# Patient Record
Sex: FEMALE | Race: WHITE | Employment: OTHER | URBAN - METROPOLITAN AREA
[De-identification: names, ages, dates, MRNs, and addresses within clinical notes are randomized per-mention and may not be internally consistent; named-entity substitution may affect disease eponyms.]

---

## 2017-01-03 PROBLEM — J40 BRONCHITIS: Status: ACTIVE | Noted: 2017-01-03

## 2017-01-11 PROBLEM — J44.9 COPD (CHRONIC OBSTRUCTIVE PULMONARY DISEASE) (HCC): Status: ACTIVE | Noted: 2017-01-11

## 2017-01-18 ENCOUNTER — HOSPITAL ENCOUNTER (OUTPATIENT)
Dept: PULMONOLOGY | Age: 82
Discharge: HOME | End: 2017-01-18
Attending: RADIOLOGY | Admitting: RADIOLOGY

## 2017-01-18 VITALS — RESPIRATION RATE: 17 BRPM | OXYGEN SATURATION: 89 %

## 2017-01-18 DIAGNOSIS — C34.91 MALIGNANT NEOPLASM OF UNSPECIFIED PART OF RIGHT BRONCHUS OR LUNG (HCC): ICD-10-CM

## 2017-01-18 RX ORDER — ALBUTEROL SULFATE 2.5 MG/3ML
2.5 SOLUTION RESPIRATORY (INHALATION) ONCE
Status: COMPLETED | OUTPATIENT
Start: 2017-01-18 | End: 2017-01-18

## 2017-01-18 RX ADMIN — ALBUTEROL SULFATE 2.5 MG: 2.5 SOLUTION RESPIRATORY (INHALATION) at 11:26

## 2017-01-25 PROBLEM — R06.02 SOB (SHORTNESS OF BREATH): Status: ACTIVE | Noted: 2017-01-25

## 2017-01-25 PROBLEM — J44.1 COPD EXACERBATION (HCC): Status: ACTIVE | Noted: 2017-01-25

## 2017-01-25 PROBLEM — Z72.0 TOBACCO ABUSE: Status: ACTIVE | Noted: 2017-01-25

## 2017-01-25 PROBLEM — J44.1 COPD EXACERBATION (HCC): Status: ACTIVE | Noted: 2017-01-11

## 2017-01-25 PROBLEM — J40 TRACHEOBRONCHITIS: Status: ACTIVE | Noted: 2017-01-25

## 2017-01-25 PROBLEM — R09.02 HYPOXIA: Status: ACTIVE | Noted: 2017-01-25

## 2019-01-01 ENCOUNTER — HOSPITAL ENCOUNTER (OUTPATIENT)
Age: 84
Setting detail: OBSERVATION
Discharge: HOME | End: 2020-01-01
Attending: EMERGENCY MEDICINE | Admitting: INTERNAL MEDICINE
Payer: MEDICARE

## 2019-01-01 ENCOUNTER — HOSPITAL ENCOUNTER (EMERGENCY)
Age: 84
Discharge: HOME | End: 2019-09-12
Payer: MEDICARE

## 2019-01-01 ENCOUNTER — APPOINTMENT (OUTPATIENT)
Dept: GENERAL RADIOLOGY | Age: 84
End: 2019-01-01
Payer: MEDICARE

## 2019-01-01 ENCOUNTER — HOSPITAL ENCOUNTER (EMERGENCY)
Age: 84
Discharge: HOME | End: 2019-05-08
Payer: MEDICARE

## 2019-01-01 VITALS
HEIGHT: 64 IN | HEART RATE: 78 BPM | DIASTOLIC BLOOD PRESSURE: 67 MMHG | RESPIRATION RATE: 16 BRPM | TEMPERATURE: 98.2 F | BODY MASS INDEX: 24.24 KG/M2 | SYSTOLIC BLOOD PRESSURE: 112 MMHG | WEIGHT: 142 LBS | OXYGEN SATURATION: 94 %

## 2019-01-01 VITALS
RESPIRATION RATE: 17 BRPM | BODY MASS INDEX: 24.07 KG/M2 | HEIGHT: 64 IN | SYSTOLIC BLOOD PRESSURE: 98 MMHG | HEART RATE: 80 BPM | WEIGHT: 141 LBS | OXYGEN SATURATION: 93 % | DIASTOLIC BLOOD PRESSURE: 70 MMHG | TEMPERATURE: 98.1 F

## 2019-01-01 DIAGNOSIS — S90.32XA CONTUSION OF LEFT FOOT, INITIAL ENCOUNTER: ICD-10-CM

## 2019-01-01 DIAGNOSIS — S92.535A CLOSED NONDISPLACED FRACTURE OF DISTAL PHALANX OF LESSER TOE OF LEFT FOOT, INITIAL ENCOUNTER: Primary | ICD-10-CM

## 2019-01-01 DIAGNOSIS — J18.9 PNEUMONIA DUE TO ORGANISM: Primary | ICD-10-CM

## 2019-01-01 DIAGNOSIS — N39.0 URINARY TRACT INFECTION WITHOUT HEMATURIA, SITE UNSPECIFIED: ICD-10-CM

## 2019-01-01 LAB
A/G RATIO: 1.1 (ref 1.1–2.2)
A/G RATIO: 1.3 (ref 1.1–2.2)
ALBUMIN SERPL-MCNC: 3.5 G/DL (ref 3.4–5)
ALBUMIN SERPL-MCNC: 3.6 G/DL (ref 3.4–5)
ALP BLD-CCNC: 77 U/L (ref 40–129)
ALP BLD-CCNC: 84 U/L (ref 40–129)
ALT SERPL-CCNC: 10 U/L (ref 10–40)
ALT SERPL-CCNC: 11 U/L (ref 10–40)
ANION GAP SERPL CALCULATED.3IONS-SCNC: 11 MMOL/L (ref 3–16)
ANION GAP SERPL CALCULATED.3IONS-SCNC: 8 MMOL/L (ref 3–16)
AST SERPL-CCNC: 18 U/L (ref 15–37)
AST SERPL-CCNC: 23 U/L (ref 15–37)
BACTERIA: ABNORMAL /HPF
BACTERIA: ABNORMAL /HPF
BASE EXCESS ARTERIAL: 9.7 MMOL/L (ref -3–3)
BASOPHILS ABSOLUTE: 0 K/UL (ref 0–0.2)
BASOPHILS ABSOLUTE: 0.2 K/UL (ref 0–0.2)
BASOPHILS RELATIVE PERCENT: 0.4 %
BASOPHILS RELATIVE PERCENT: 2.3 %
BILIRUB SERPL-MCNC: 0.4 MG/DL (ref 0–1)
BILIRUB SERPL-MCNC: 0.6 MG/DL (ref 0–1)
BILIRUBIN URINE: NEGATIVE
BILIRUBIN URINE: NEGATIVE
BLOOD CULTURE, ROUTINE: NORMAL
BLOOD, URINE: NEGATIVE
BLOOD, URINE: NEGATIVE
BUN BLDV-MCNC: 13 MG/DL (ref 7–20)
BUN BLDV-MCNC: 18 MG/DL (ref 7–20)
CALCIUM SERPL-MCNC: 8.8 MG/DL (ref 8.3–10.6)
CALCIUM SERPL-MCNC: 8.9 MG/DL (ref 8.3–10.6)
CARBOXYHEMOGLOBIN ARTERIAL: 1.5 % (ref 0–1.5)
CHLORIDE BLD-SCNC: 93 MMOL/L (ref 99–110)
CHLORIDE BLD-SCNC: 97 MMOL/L (ref 99–110)
CLARITY: ABNORMAL
CLARITY: ABNORMAL
CO2: 32 MMOL/L (ref 21–32)
CO2: 35 MMOL/L (ref 21–32)
COLOR: ABNORMAL
COLOR: YELLOW
CREAT SERPL-MCNC: 0.6 MG/DL (ref 0.6–1.2)
CREAT SERPL-MCNC: 0.6 MG/DL (ref 0.6–1.2)
CULTURE, BLOOD 2: NORMAL
EKG ATRIAL RATE: 84 BPM
EKG DIAGNOSIS: NORMAL
EKG Q-T INTERVAL: 340 MS
EKG QRS DURATION: 72 MS
EKG QTC CALCULATION (BAZETT): 404 MS
EKG R AXIS: 9 DEGREES
EKG T AXIS: -83 DEGREES
EKG VENTRICULAR RATE: 85 BPM
EOSINOPHILS ABSOLUTE: 0 K/UL (ref 0–0.6)
EOSINOPHILS ABSOLUTE: 0.2 K/UL (ref 0–0.6)
EOSINOPHILS RELATIVE PERCENT: 0.7 %
EOSINOPHILS RELATIVE PERCENT: 2.6 %
EPITHELIAL CELLS, UA: ABNORMAL /HPF
EPITHELIAL CELLS, UA: ABNORMAL /HPF
GFR AFRICAN AMERICAN: >60
GFR AFRICAN AMERICAN: >60
GFR NON-AFRICAN AMERICAN: >60
GFR NON-AFRICAN AMERICAN: >60
GLOBULIN: 2.7 G/DL
GLOBULIN: 3.2 G/DL
GLUCOSE BLD-MCNC: 213 MG/DL (ref 70–99)
GLUCOSE BLD-MCNC: 97 MG/DL (ref 70–99)
GLUCOSE URINE: NEGATIVE MG/DL
GLUCOSE URINE: NEGATIVE MG/DL
HCO3 ARTERIAL: 37.2 MMOL/L (ref 21–29)
HCT VFR BLD CALC: 40.8 % (ref 36–48)
HCT VFR BLD CALC: 42.5 % (ref 36–48)
HEMOGLOBIN, ART, EXTENDED: 13.3 G/DL (ref 12–16)
HEMOGLOBIN: 13.5 G/DL (ref 12–16)
HEMOGLOBIN: 14.3 G/DL (ref 12–16)
KETONES, URINE: ABNORMAL MG/DL
KETONES, URINE: NEGATIVE MG/DL
LACTIC ACID: 1.6 MMOL/L (ref 0.4–2)
LEUKOCYTE ESTERASE, URINE: ABNORMAL
LEUKOCYTE ESTERASE, URINE: ABNORMAL
LYMPHOCYTES ABSOLUTE: 1 K/UL (ref 1–5.1)
LYMPHOCYTES ABSOLUTE: 1.9 K/UL (ref 1–5.1)
LYMPHOCYTES RELATIVE PERCENT: 14.9 %
LYMPHOCYTES RELATIVE PERCENT: 25.2 %
MCH RBC QN AUTO: 30.1 PG (ref 26–34)
MCH RBC QN AUTO: 30.2 PG (ref 26–34)
MCHC RBC AUTO-ENTMCNC: 33.1 G/DL (ref 31–36)
MCHC RBC AUTO-ENTMCNC: 33.6 G/DL (ref 31–36)
MCV RBC AUTO: 89.9 FL (ref 80–100)
MCV RBC AUTO: 91 FL (ref 80–100)
METHEMOGLOBIN ARTERIAL: 0.3 %
MICROSCOPIC EXAMINATION: YES
MICROSCOPIC EXAMINATION: YES
MONOCYTES ABSOLUTE: 0.5 K/UL (ref 0–1.3)
MONOCYTES ABSOLUTE: 0.6 K/UL (ref 0–1.3)
MONOCYTES RELATIVE PERCENT: 6.8 %
MONOCYTES RELATIVE PERCENT: 8.1 %
MUCUS: ABNORMAL /LPF
MUCUS: ABNORMAL /LPF
NEUTROPHILS ABSOLUTE: 4.8 K/UL (ref 1.7–7.7)
NEUTROPHILS ABSOLUTE: 5.3 K/UL (ref 1.7–7.7)
NEUTROPHILS RELATIVE PERCENT: 63.1 %
NEUTROPHILS RELATIVE PERCENT: 75.9 %
NITRITE, URINE: NEGATIVE
NITRITE, URINE: NEGATIVE
O2 CONTENT ARTERIAL: 18 ML/DL
O2 SAT, ARTERIAL: 96.8 %
O2 THERAPY: ABNORMAL
ORGANISM: ABNORMAL
PCO2 ARTERIAL: 63.8 MMHG (ref 35–45)
PDW BLD-RTO: 13.9 % (ref 12.4–15.4)
PDW BLD-RTO: 14.4 % (ref 12.4–15.4)
PH ARTERIAL: 7.38 (ref 7.35–7.45)
PH UA: 5.5 (ref 5–8)
PH UA: 6 (ref 5–8)
PLATELET # BLD: 154 K/UL (ref 135–450)
PLATELET # BLD: 170 K/UL (ref 135–450)
PMV BLD AUTO: 9.2 FL (ref 5–10.5)
PMV BLD AUTO: 9.4 FL (ref 5–10.5)
PO2 ARTERIAL: 93.2 MMHG (ref 75–108)
POTASSIUM REFLEX MAGNESIUM: 3.6 MMOL/L (ref 3.5–5.1)
POTASSIUM REFLEX MAGNESIUM: 4.3 MMOL/L (ref 3.5–5.1)
PRO-BNP: 1041 PG/ML (ref 0–449)
PROTEIN UA: 30 MG/DL
PROTEIN UA: NEGATIVE MG/DL
RBC # BLD: 4.48 M/UL (ref 4–5.2)
RBC # BLD: 4.73 M/UL (ref 4–5.2)
RBC UA: ABNORMAL /HPF (ref 0–2)
RBC UA: ABNORMAL /HPF (ref 0–2)
SODIUM BLD-SCNC: 136 MMOL/L (ref 136–145)
SODIUM BLD-SCNC: 140 MMOL/L (ref 136–145)
SPECIFIC GRAVITY UA: 1.02 (ref 1–1.03)
SPECIFIC GRAVITY UA: >=1.03 (ref 1–1.03)
TCO2 ARTERIAL: 39.1 MMOL/L
TOTAL PROTEIN: 6.3 G/DL (ref 6.4–8.2)
TOTAL PROTEIN: 6.7 G/DL (ref 6.4–8.2)
TROPONIN: <0.01 NG/ML
URINE CULTURE, ROUTINE: ABNORMAL
URINE CULTURE, ROUTINE: ABNORMAL
URINE REFLEX TO CULTURE: YES
URINE REFLEX TO CULTURE: YES
URINE TYPE: ABNORMAL
URINE TYPE: ABNORMAL
UROBILINOGEN, URINE: 0.2 E.U./DL
UROBILINOGEN, URINE: 1 E.U./DL
WBC # BLD: 7 K/UL (ref 4–11)
WBC # BLD: 7.7 K/UL (ref 4–11)
WBC UA: ABNORMAL /HPF (ref 0–5)
WBC UA: ABNORMAL /HPF (ref 0–5)

## 2019-01-01 PROCEDURE — 2580000003 HC RX 258: Performed by: EMERGENCY MEDICINE

## 2019-01-01 PROCEDURE — 83605 ASSAY OF LACTIC ACID: CPT

## 2019-01-01 PROCEDURE — 82803 BLOOD GASES ANY COMBINATION: CPT

## 2019-01-01 PROCEDURE — 87086 URINE CULTURE/COLONY COUNT: CPT

## 2019-01-01 PROCEDURE — G0378 HOSPITAL OBSERVATION PER HR: HCPCS

## 2019-01-01 PROCEDURE — 6360000002 HC RX W HCPCS: Performed by: EMERGENCY MEDICINE

## 2019-01-01 PROCEDURE — 99285 EMERGENCY DEPT VISIT HI MDM: CPT

## 2019-01-01 PROCEDURE — 6360000002 HC RX W HCPCS: Performed by: PHYSICIAN ASSISTANT

## 2019-01-01 PROCEDURE — 94761 N-INVAS EAR/PLS OXIMETRY MLT: CPT

## 2019-01-01 PROCEDURE — 6370000000 HC RX 637 (ALT 250 FOR IP): Performed by: NURSE PRACTITIONER

## 2019-01-01 PROCEDURE — 99284 EMERGENCY DEPT VISIT MOD MDM: CPT

## 2019-01-01 PROCEDURE — 87040 BLOOD CULTURE FOR BACTERIA: CPT

## 2019-01-01 PROCEDURE — 99220 PR INITIAL OBSERVATION CARE/DAY 70 MINUTES: CPT | Performed by: PHYSICIAN ASSISTANT

## 2019-01-01 PROCEDURE — 6370000000 HC RX 637 (ALT 250 FOR IP): Performed by: PHYSICIAN ASSISTANT

## 2019-01-01 PROCEDURE — 93005 ELECTROCARDIOGRAM TRACING: CPT | Performed by: EMERGENCY MEDICINE

## 2019-01-01 PROCEDURE — 97116 GAIT TRAINING THERAPY: CPT

## 2019-01-01 PROCEDURE — 97530 THERAPEUTIC ACTIVITIES: CPT

## 2019-01-01 PROCEDURE — 73610 X-RAY EXAM OF ANKLE: CPT

## 2019-01-01 PROCEDURE — 96360 HYDRATION IV INFUSION INIT: CPT

## 2019-01-01 PROCEDURE — 81001 URINALYSIS AUTO W/SCOPE: CPT

## 2019-01-01 PROCEDURE — 96365 THER/PROPH/DIAG IV INF INIT: CPT

## 2019-01-01 PROCEDURE — 83880 ASSAY OF NATRIURETIC PEPTIDE: CPT

## 2019-01-01 PROCEDURE — 36600 WITHDRAWAL OF ARTERIAL BLOOD: CPT

## 2019-01-01 PROCEDURE — 85025 COMPLETE CBC W/AUTO DIFF WBC: CPT

## 2019-01-01 PROCEDURE — 2700000000 HC OXYGEN THERAPY PER DAY

## 2019-01-01 PROCEDURE — 96367 TX/PROPH/DG ADDL SEQ IV INF: CPT

## 2019-01-01 PROCEDURE — 73630 X-RAY EXAM OF FOOT: CPT

## 2019-01-01 PROCEDURE — 80053 COMPREHEN METABOLIC PANEL: CPT

## 2019-01-01 PROCEDURE — 87186 SC STD MICRODIL/AGAR DIL: CPT

## 2019-01-01 PROCEDURE — 84484 ASSAY OF TROPONIN QUANT: CPT

## 2019-01-01 PROCEDURE — 97535 SELF CARE MNGMENT TRAINING: CPT

## 2019-01-01 PROCEDURE — 71046 X-RAY EXAM CHEST 2 VIEWS: CPT

## 2019-01-01 PROCEDURE — 93010 ELECTROCARDIOGRAM REPORT: CPT | Performed by: INTERNAL MEDICINE

## 2019-01-01 PROCEDURE — 96361 HYDRATE IV INFUSION ADD-ON: CPT

## 2019-01-01 PROCEDURE — 2580000003 HC RX 258: Performed by: NURSE PRACTITIONER

## 2019-01-01 PROCEDURE — 97161 PT EVAL LOW COMPLEX 20 MIN: CPT

## 2019-01-01 PROCEDURE — 99283 EMERGENCY DEPT VISIT LOW MDM: CPT

## 2019-01-01 PROCEDURE — 97165 OT EVAL LOW COMPLEX 30 MIN: CPT

## 2019-01-01 PROCEDURE — 36415 COLL VENOUS BLD VENIPUNCTURE: CPT

## 2019-01-01 PROCEDURE — 2580000003 HC RX 258: Performed by: PHYSICIAN ASSISTANT

## 2019-01-01 PROCEDURE — 87077 CULTURE AEROBIC IDENTIFY: CPT

## 2019-01-01 PROCEDURE — 6360000002 HC RX W HCPCS: Performed by: NURSE PRACTITIONER

## 2019-01-01 RX ORDER — ALBUTEROL SULFATE 90 UG/1
AEROSOL, METERED RESPIRATORY (INHALATION)
Qty: 1 INHALER | Refills: 1 | Status: ON HOLD | OUTPATIENT
Start: 2019-01-01 | End: 2019-01-01 | Stop reason: ALTCHOICE

## 2019-01-01 RX ORDER — ONDANSETRON 2 MG/ML
4 INJECTION INTRAMUSCULAR; INTRAVENOUS EVERY 6 HOURS PRN
Status: DISCONTINUED | OUTPATIENT
Start: 2019-01-01 | End: 2020-01-01 | Stop reason: HOSPADM

## 2019-01-01 RX ORDER — NAPROXEN 250 MG/1
250 TABLET ORAL ONCE
Status: COMPLETED | OUTPATIENT
Start: 2019-01-01 | End: 2019-01-01

## 2019-01-01 RX ORDER — DIGOXIN 250 MCG
250 TABLET ORAL DAILY
Status: DISCONTINUED | OUTPATIENT
Start: 2019-01-01 | End: 2020-01-01 | Stop reason: HOSPADM

## 2019-01-01 RX ORDER — PAROXETINE HYDROCHLORIDE 20 MG/1
40 TABLET, FILM COATED ORAL DAILY
Status: DISCONTINUED | OUTPATIENT
Start: 2019-01-01 | End: 2020-01-01 | Stop reason: HOSPADM

## 2019-01-01 RX ORDER — SODIUM CHLORIDE 0.9 % (FLUSH) 0.9 %
10 SYRINGE (ML) INJECTION EVERY 12 HOURS SCHEDULED
Status: DISCONTINUED | OUTPATIENT
Start: 2019-01-01 | End: 2020-01-01 | Stop reason: HOSPADM

## 2019-01-01 RX ORDER — ALBUTEROL SULFATE 2.5 MG/3ML
5 SOLUTION RESPIRATORY (INHALATION) ONCE
Status: COMPLETED | OUTPATIENT
Start: 2019-01-01 | End: 2019-01-01

## 2019-01-01 RX ORDER — MIRTAZAPINE 15 MG/1
15 TABLET, FILM COATED ORAL NIGHTLY
COMMUNITY

## 2019-01-01 RX ORDER — SODIUM CHLORIDE 0.9 % (FLUSH) 0.9 %
10 SYRINGE (ML) INJECTION PRN
Status: DISCONTINUED | OUTPATIENT
Start: 2019-01-01 | End: 2020-01-01 | Stop reason: HOSPADM

## 2019-01-01 RX ORDER — 0.9 % SODIUM CHLORIDE 0.9 %
1000 INTRAVENOUS SOLUTION INTRAVENOUS ONCE
Status: COMPLETED | OUTPATIENT
Start: 2019-01-01 | End: 2019-01-01

## 2019-01-01 RX ORDER — PREDNISONE 20 MG/1
60 TABLET ORAL ONCE
Status: COMPLETED | OUTPATIENT
Start: 2019-01-01 | End: 2019-01-01

## 2019-01-01 RX ORDER — ACETAMINOPHEN 325 MG/1
650 TABLET ORAL EVERY 4 HOURS PRN
Status: DISCONTINUED | OUTPATIENT
Start: 2019-01-01 | End: 2020-01-01 | Stop reason: HOSPADM

## 2019-01-01 RX ORDER — SODIUM CHLORIDE 9 MG/ML
INJECTION, SOLUTION INTRAVENOUS CONTINUOUS
Status: DISCONTINUED | OUTPATIENT
Start: 2019-01-01 | End: 2020-01-01

## 2019-01-01 RX ORDER — CEFUROXIME AXETIL 250 MG/1
250 TABLET ORAL 2 TIMES DAILY
Qty: 14 TABLET | Refills: 0 | Status: SHIPPED | OUTPATIENT
Start: 2019-01-01 | End: 2019-01-01

## 2019-01-01 RX ORDER — PREDNISONE 20 MG/1
TABLET ORAL
Qty: 18 TABLET | Refills: 0 | Status: ON HOLD | OUTPATIENT
Start: 2019-01-01 | End: 2019-01-01 | Stop reason: ALTCHOICE

## 2019-01-01 RX ORDER — ANASTROZOLE 1 MG/1
0.5 TABLET ORAL DAILY
Status: DISCONTINUED | OUTPATIENT
Start: 2020-01-01 | End: 2020-01-01 | Stop reason: HOSPADM

## 2019-01-01 RX ORDER — AZITHROMYCIN 250 MG/1
TABLET, FILM COATED ORAL
Qty: 6 TABLET | Refills: 0 | Status: SHIPPED | OUTPATIENT
Start: 2019-01-01 | End: 2019-01-01

## 2019-01-01 RX ADMIN — PAROXETINE HYDROCHLORIDE 40 MG: 20 TABLET, FILM COATED ORAL at 15:54

## 2019-01-01 RX ADMIN — SODIUM CHLORIDE 1000 ML: 9 INJECTION, SOLUTION INTRAVENOUS at 11:29

## 2019-01-01 RX ADMIN — SODIUM CHLORIDE: 9 INJECTION, SOLUTION INTRAVENOUS at 13:35

## 2019-01-01 RX ADMIN — ALBUTEROL SULFATE 5 MG: 2.5 SOLUTION RESPIRATORY (INHALATION) at 22:44

## 2019-01-01 RX ADMIN — CEFTRIAXONE SODIUM 1 G: 1 INJECTION, POWDER, FOR SOLUTION INTRAMUSCULAR; INTRAVENOUS at 12:57

## 2019-01-01 RX ADMIN — PREDNISONE 60 MG: 20 TABLET ORAL at 22:44

## 2019-01-01 RX ADMIN — DIGOXIN 250 MCG: 250 TABLET ORAL at 15:54

## 2019-01-01 RX ADMIN — NAPROXEN 250 MG: 250 TABLET ORAL at 16:40

## 2019-01-01 RX ADMIN — ENOXAPARIN SODIUM 40 MG: 40 INJECTION SUBCUTANEOUS at 15:54

## 2019-01-01 RX ADMIN — AZITHROMYCIN DIHYDRATE 500 MG: 500 INJECTION, POWDER, LYOPHILIZED, FOR SOLUTION INTRAVENOUS at 22:23

## 2019-01-01 RX ADMIN — CEFTRIAXONE SODIUM 1 G: 1 INJECTION, POWDER, FOR SOLUTION INTRAMUSCULAR; INTRAVENOUS at 22:22

## 2019-01-01 ASSESSMENT — PAIN DESCRIPTION - LOCATION: LOCATION: FOOT

## 2019-01-01 ASSESSMENT — ENCOUNTER SYMPTOMS
SHORTNESS OF BREATH: 0
SHORTNESS OF BREATH: 0
ABDOMINAL PAIN: 0
ABDOMINAL PAIN: 0

## 2019-01-01 ASSESSMENT — PAIN DESCRIPTION - DESCRIPTORS: DESCRIPTORS: ACHING

## 2019-01-01 ASSESSMENT — PAIN SCALES - GENERAL
PAINLEVEL_OUTOF10: 6
PAINLEVEL_OUTOF10: 6

## 2019-01-01 ASSESSMENT — PAIN DESCRIPTION - ORIENTATION: ORIENTATION: LEFT

## 2019-01-01 ASSESSMENT — PAIN DESCRIPTION - PAIN TYPE: TYPE: ACUTE PAIN

## 2019-05-08 NOTE — ED PROVIDER NOTES
Evaluated by [id number] [facility] Provider           [facility] [address] [facility] ED  eMERGENCY dEPARTMENT eNCOUnter        Pt Name: Astrid Serrano  MRN: [id number]  Karin 9/2/1932  Dateof evaluation: 5/8/2019  Provider: RAJAT Hurtado CNP  PCP: RAJAT Reynoso CNP  ED Attending: No att. providers found    [address] [facility]       Chief Complaint   Patient presents with    Foot Pain     left foot pain started today after she fell at home. HISTORY OF PRESENTILLNESS   (Location/Symptom, Timing/Onset, Context/Setting, Quality, Duration, Modifying Factors, Severity)  Note limiting factors. Astrid Serrano is a 80 y.o. female for left foot and ankle injury. Onset was today. Duration has been since the onset. Context includes pt twisted left ankle today when she tripped on her chair. Alleviating factors include nothing. Aggravating factors include nothing. Pain is 610. nothing has been used for pain today. Nursing Notes were all reviewed and agreed with or any disagreements were addressed  in the HPI. REVIEW OF SYSTEMS    (2-9 systems for level 4, 10 or more for level 5)     Review of Systems   Constitutional: Negative for fever. Respiratory: Negative for shortness of breath. Cardiovascular: Negative for chest pain. Gastrointestinal: Negative for abdominal pain. Genitourinary: Negative for difficulty urinating. Musculoskeletal:        Left ankle and foot pain   All other systems reviewed and are negative. Positives and Pertinent negatives as per HPI. Except as noted above in the ROS, all other systems were reviewed and negative.        PAST MEDICAL HISTORY     Past Medical History:   Diagnosis Date    Atrial fibrillation ([address]     Breast cancer ([address]     COPD (chronic obstructive pulmonary disease) ([address]     Hypertension     Lung cancer ([address]          SURGICAL HISTORY       Past Surgical History:   Procedure Laterality Date    CHOLECYSTECTOMY      HYSTERECTOMY      MASTECTOMY  LEFT BREAST         CURRENT MEDICATIONS       Discharge Medication List as of 5/8/2019  5:24 PM      CONTINUE these medications which have NOT CHANGED    Details   HYDROcodone-acetaminophen (NORCO) 5-325 MG per tablet Take 1 tablet by mouth every 6 hours as needed for Pain ., Disp-20 tablet, R-0      ipratropium-albuterol (DUONEB) 0.5-2.5 (3) MG/3ML SOLN nebulizer solution Inhale 3 mLs into the lungs every 4 hours, Disp-360 mL, R-0      albuterol sulfate HFA (PROVENTIL HFA) 108 (90 BASE) MCG/ACT inhaler Inhale 2 puffs into the lungs every 6 hours as needed for Wheezing, Disp-1 Inhaler, R-0      digoxin (LANOXIN) 250 MCG tablet TAKE 1 TABLET BY MOUTH DAILY, R-4      atenolol (TENORMIN) 25 MG tablet TAKE 1/2 tablet by MOUTH DAILY, R-0      anastrozole (ARIMIDEX) 1 MG tablet 0.5 mg Indications: half of pill daily , R-3      lisinopril-hydrochlorothiazide (PRINZIDE;ZESTORETIC) 20-12.5 MG per tablet 0.5 tablets Indications: half of pil daily , R-3      PARoxetine (PAXIL) 40 MG tablet R-3               ALLERGIES     Codeine    FAMILY HISTORY     History reviewed. No pertinent family history.        SOCIAL HISTORY       Social History     Socioeconomic History    Marital status:      Spouse name: None    Number of children: None    Years of education: None    Highest education level: None   Occupational History    None   Social Needs    Financial resource strain: None    Food insecurity:     Worry: None     Inability: None    Transportation needs:     Medical: None     Non-medical: None   Tobacco Use    Smoking status: Current Every Day Smoker     Packs/day: 0.50   Substance and Sexual Activity    Alcohol use: No    Drug use: No    Sexual activity: None   Lifestyle    Physical activity:     Days per week: None     Minutes per session: None    Stress: None   Relationships    Social connections:     Talks on phone: None     Gets together: None     Attends Caodaism service: None     Active 4th  phalanx.  Correlation to the patient's site of pain is recommended. Otherwise, no evidence of a fracture of the left foot or ankle. 2. Moderate plantar calcaneal spur. Interpretation per the Radiologist below, if available at the time of this note:    XR ANKLE LEFT (MIN 3 VIEWS)   Final Result   1. Question of a nondisplaced fracture of the distal end of the proximal 4th   phalanx. Correlation to the patient's site of pain is recommended. Otherwise, no evidence of a fracture of the left foot or ankle. 2. Moderate plantar calcaneal spur. XR FOOT LEFT (MIN 3 VIEWS)   Final Result   1. Question of a nondisplaced fracture of the distal end of the proximal 4th   phalanx. Correlation to the patient's site of pain is recommended. Otherwise, no evidence of a fracture of the left foot or ankle. 2. Moderate plantar calcaneal spur. No results found. PROCEDURES   Unless otherwise noted below, none     Procedures    CRITICAL CARE TIME   N/A    CONSULTS:  None      EMERGENCYDEPARTMENT COURSE and DIFFERENTIAL DIAGNOSIS/MDM:   Vitals:    Vitals:    05/08/19 1614   BP: 112/67   Pulse: 78   Resp: 16   Temp: 98.2 °F (36.8 °C)   TempSrc: Oral   SpO2: 94%   Weight: 142 lb (64.4 kg)   Height: 5' 4\" (1.626 m)       Patient was given the following medications:  Medications   naproxen (NAPROSYN) tablet 250 mg (250 mg Oral Given 5/8/19 1640)       Patient was seen and evaluated by myself. Patient here for left foot and ankle pain. Patient states that she fell when she tripped over her chair today injuring her left ankle and foot. On exam she is awake and alert hemodynamically stable nontoxic in appearance. Patient is neurologically intact her left foot. X-ray was concerning for a questionable nondisplaced fracture of the distal end of the fourth proximal phalanx.   Based on this read the patient we placed an orthotic boot and sent to the orthopedist.  She was given a dose of Naprosyn in the ED. She was encouraged to use over-the-counter Tylenol Motrin for discomfort. She was encouraged to follow up with her primary care doctor in the next few days and return to the ED for worsening symptoms. She was also encouraged follow-up with the orthopedist.  Patient was discharged home with all questions answered. The patient tolerated their visit well. I have evaluated thispatient. My supervising physician was available for consultation. The patient and / or the family were informed of the results of any tests, a time was given to answer questions, a plan was proposed and they agreed Jade Arango. FINAL IMPRESSION      1. Closed nondisplaced fracture of distal phalanx of lesser toe of left foot, initial encounter    2.  Contusion of left foot, initial encounter          DISPOSITION/PLAN   DISPOSITION Decision To Discharge 05/08/2019 05:21:16 PM      PATIENT REFERRED TO:  Marimar Merlos [id number]  [phone]    Schedule an appointment as soon as possible for a visit   for re-evaluation    [facility] ED  [address] [facility]  [phone]    If symptoms worsen      DISCHARGE MEDICATIONS:  Discharge Medication List as of 5/8/2019  5:24 PM          DISCONTINUED MEDICATIONS:  Discharge Medication List as of 5/8/2019  5:24 PM                 (Please note that portions of this note were completed with a voice recognition program.  Efforts were made to edit the dictations but occasionally words are mis-transcribed.)    RAJAT Camarillo CNP (electronically signed)         RAJAT Camarillo CNP  05/08/19 [id number] [facility] 195, APRN - CNP  05/08/19 1369

## 2019-05-08 NOTE — ED NOTES
Reviewed discharge instructions with pt, verbalized understanding,  Denies questions at this time. Ambulated off unit without assistance.       Massiel Ding RN  05/08/19 2814

## 2019-09-12 NOTE — ED PROVIDER NOTES
All other systems reviewed and are negative. Positives and Pertinent negatives as per HPI. Except as noted above in the ROS, all other systems were reviewed and negative. PAST MEDICAL HISTORY     Past Medical History:   Diagnosis Date    Atrial fibrillation ([facility]     Breast cancer ([facility]     COPD (chronic obstructive pulmonary disease) ([facility]     Hypertension     Lung cancer ([facility]          SURGICAL HISTORY       Past Surgical History:   Procedure Laterality Date    CHOLECYSTECTOMY      HYSTERECTOMY      MASTECTOMY  LEFT BREAST         CURRENT MEDICATIONS       Discharge Medication List as of 9/12/2019 12:34 AM      CONTINUE these medications which have NOT CHANGED    Details   HYDROcodone-acetaminophen (NORCO) 5-325 MG per tablet Take 1 tablet by mouth every 6 hours as needed for Pain ., Disp-20 tablet, R-0      ipratropium-albuterol (DUONEB) 0.5-2.5 (3) MG/3ML SOLN nebulizer solution Inhale 3 mLs into the lungs every 4 hours, Disp-360 mL, R-0      !! albuterol sulfate HFA (PROVENTIL HFA) 108 (90 BASE) MCG/ACT inhaler Inhale 2 puffs into the lungs every 6 hours as needed for Wheezing, Disp-1 Inhaler, R-0      digoxin (LANOXIN) 250 MCG tablet TAKE 1 TABLET BY MOUTH DAILY, R-4      atenolol (TENORMIN) 25 MG tablet TAKE 1/2 tablet by MOUTH DAILY, R-0      anastrozole (ARIMIDEX) 1 MG tablet 0.5 mg Indications: half of pill daily , R-3      lisinopril-hydrochlorothiazide (PRINZIDE;ZESTORETIC) 20-12.5 MG per tablet 0.5 tablets Indications: half of pil daily , R-3      PARoxetine (PAXIL) 40 MG tablet R-3       !! - Potential duplicate medications found. Please discuss with provider. ALLERGIES     Codeine    FAMILY HISTORY     No family history on file.        SOCIAL HISTORY       Social History     Socioeconomic History    Marital status:      Spouse name: Not on file    Number of children: Not on file    Years of education: Not on file    Highest education level: Not on file Occupational History    Not on file   Social Needs    Financial resource strain: Not on file    Food insecurity:     Worry: Not on file     Inability: Not on file    Transportation needs:     Medical: Not on file     Non-medical: Not on file   Tobacco Use    Smoking status: Current Every Day Smoker     Packs/day: 0.50   Substance and Sexual Activity    Alcohol use: No    Drug use: No    Sexual activity: Not on file   Lifestyle    Physical activity:     Days per week: Not on file     Minutes per session: Not on file    Stress: Not on file   Relationships    Social connections:     Talks on phone: Not on file     Gets together: Not on file     Attends Yazdanism service: Not on file     Active member of club or organization: Not on file     Attends meetings of clubs or organizations: Not on file     Relationship status: Not on file    Intimate partner violence:     Fear of current or ex partner: Not on file     Emotionally abused: Not on file     Physically abused: Not on file     Forced sexual activity: Not on file   Other Topics Concern    Not on file   Social History Narrative    Not on file       SCREENINGS    [place] Coma Scale  Eye Opening: Spontaneous  Best Verbal Response: Oriented  Best Motor Response: Obeys commands  [place] Coma Scale Score: 15        PHYSICAL EXAM  (up to 7 for level 4, 8 or more for level 5)     ED Triage Vitals   BP Temp Temp Source Pulse Resp SpO2 Height Weight   09/11/19 1912 09/11/19 1911 09/11/19 1911 09/11/19 1911 09/11/19 1912 09/11/19 1912 09/11/19 1911 09/11/19 1911   111/61 98.5 °F (36.9 °C) Temporal 83 18 91 % 5' 4\" (1.626 m) 141 lb (64 kg)       Physical Exam   Constitutional: She is oriented to person, place, and time. Elderly female   HENT:   Head: Normocephalic and atraumatic. Neck: Normal range of motion. Cardiovascular: Normal rate. Pulmonary/Chest: Effort normal. No respiratory distress. She has wheezes. Abdominal: Soft.  She exhibits no DISPOSITION/PLAN   DISPOSITION Decision To Discharge 09/12/2019 12:23:16 AM      PATIENT REFERRED TO:  Stephani Chung [id number]  [phone]    Schedule an appointment as soon as possible for a visit   for re-evaluation    [facility]  [address] [facility]  [phone]    If symptoms worsen      DISCHARGE MEDICATIONS:  Discharge Medication List as of 9/12/2019 12:34 AM      START taking these medications    Details   predniSONE (DELTASONE) 20 MG tablet Take 3 tabs orally daily for 3 days, then take 2 tabs orally for 3 days then take 1 tab orally for 3 days. , Disp-18 tablet, R-0Print      cefUROXime (CEFTIN) 250 MG tablet Take 1 tablet by mouth 2 times daily for 7 days, Disp-14 tablet, R-0Print      azithromycin (ZITHROMAX) 250 MG tablet Take 1 tablet by mouth daily for the next 4 days. , Disp-6 tablet, R-0Print      !! albuterol sulfate HFA (PROAIR HFA) 108 (90 Base) MCG/ACT inhaler Use 4 puffs every 4 hours while awake for 7-10 days then PRN wheezing  Dispense with SPACER and Instruct on use. May sub Ventolin or Proventil as needed per Insurance., Disp-1 Inhaler, R-1Print       !! - Potential duplicate medications found. Please discuss with provider.           DISCONTINUED MEDICATIONS:  Discharge Medication List as of 9/12/2019 12:34 AM                 (Please note that portions of this note were completed with a voice recognition program.  Efforts were made to edit the dictations but occasionally words are mis-transcribed.)    RAJAT Marsh CNP (electronically signed)         RAJAT Marsh CNP  09/12/19 9723

## 2019-12-31 PROBLEM — I10 HYPERTENSION: Status: ACTIVE | Noted: 2019-01-01

## 2019-12-31 PROBLEM — N39.0 UTI (URINARY TRACT INFECTION): Status: ACTIVE | Noted: 2019-01-01

## 2019-12-31 PROBLEM — J44.9 COPD (CHRONIC OBSTRUCTIVE PULMONARY DISEASE) (HCC): Status: ACTIVE | Noted: 2017-01-25

## 2019-12-31 PROBLEM — C50.919 BREAST CANCER (HCC): Status: ACTIVE | Noted: 2019-01-01

## 2019-12-31 PROBLEM — J96.11 CHRONIC RESPIRATORY FAILURE WITH HYPOXIA (HCC): Status: ACTIVE | Noted: 2019-01-01

## 2019-12-31 NOTE — H&P
Hospital Medicine History & Physical      PCP: RAJAT King - JACQUELINE    Date of Admission: 12/31/2019    Date of Service: Pt seen/examined on  12/31/2019     Chief Complaint:    Chief Complaint   Patient presents with    Other     family called shabbir stating she may be dehydrated because she has diarrhea since day after live. daughter states that the other 3 people in the house have had diarrhea and vomiting for the last couple days. History Of Present Illness: The patient is a 80 y.o. female with COPD, tobacco dependence, chronic hypoxic resp failure,  breast cancer, lung cancer, hypertension, atrial fibrillation who presented to [facility] ED with complaint of dehydration and diarrhea. Patient has reportedly had diarrhea for the past 5 days. Multiple other people in the home have been sick with similar symptoms. Her diarrhea had slowed down, but family was concerned she was dehydrated and therefore brought her to the emergency department. She was found to be mildly hypotensive with evidence of a UTI. No diarrhea noted in the emergency room. She was admitted for further care and treatment of UTI. Past Medical History:        Diagnosis Date    Atrial fibrillation ([address] [address]     Breast cancer ([address] [address]     COPD (chronic obstructive pulmonary disease) ([address]     Hypertension     Lung cancer ([address]        Past Surgical History:        Procedure Laterality Date    CHOLECYSTECTOMY      HYSTERECTOMY      MASTECTOMY  LEFT BREAST       Medications Prior to Admission:    Prior to Admission medications    Medication Sig Start Date End Date Taking?  Authorizing Provider   digoxin (LANOXIN) 250 MCG tablet TAKE 1 TABLET BY MOUTH DAILY 12/23/16  Yes Historical Provider, MD   atenolol (TENORMIN) 25 MG tablet TAKE 1/2 tablet by MOUTH DAILY 12/13/16  Yes Historical Provider, MD   anastrozole (ARIMIDEX) 1 MG tablet 0.5 mg Indications: half of pill daily  6/14/15  Yes Historical Provider, MD lisinopril-hydrochlorothiazide (PRINZIDE;ZESTORETIC) 20-12.5 MG per tablet 0.5 tablets Indications: half of pil daily  6/12/15  Yes Historical Provider, MD   PARoxetine (PAXIL) 40 MG tablet  6/12/15  Yes Historical Provider, MD   predniSONE (DELTASONE) 20 MG tablet Take 3 tabs orally daily for 3 days, then take 2 tabs orally for 3 days then take 1 tab orally for 3 days. 9/12/19   ElRAJAT Olmos - CNP   albuterol sulfate HFA (PROAIR HFA) 108 (90 Base) MCG/ACT inhaler Use 4 puffs every 4 hours while awake for 7-10 days then PRN wheezing  Dispense with SPACER and Instruct on use. May sub Ventolin or Proventil as needed per [employer]. 9/12/19   Elnita Alexander APRN - CNP   HYDROcodone-acetaminophen (NORCO) 5-325 MG per tablet Take 1 tablet by mouth every 6 hours as needed for Pain . 2/7/17   Dann Sunshine MD   ipratropium-albuterol (DUONEB) 0.5-2.5 (3) MG/3ML SOLN nebulizer solution Inhale 3 mLs into the lungs every 4 hours 2/7/17   Dann Sunshine MD   albuterol sulfate HFA (PROVENTIL HFA) 108 (90 BASE) MCG/ACT inhaler Inhale 2 puffs into the lungs every 6 hours as needed for Wheezing 1/26/17   Steven Myers MD       Allergies:  Codeine    Social History:  The patient currently lives at home with [facility] and family     TOBACCO:   reports that she has been smoking. She has been smoking about 0.50 packs per day. She does not have any smokeless tobacco history on file. ETOH:   reports no history of alcohol use. Family History:   Positive as follows:    History reviewed. No pertinent family history.     REVIEW OF SYSTEMS:       Constitutional: Negative for fever   HENT: Negative for sore throat   Eyes: Negative for redness   Respiratory: Negative  for dyspnea, cough   Cardiovascular: Negative for chest pain   Gastrointestinal: Negative for vomiting, +diarrhea   Genitourinary: Negative for hematuria   Musculoskeletal: Negative for arthralgias   Skin: Negative for rash   Neurological: illness  -Seems to have now resolved, no diarrhea since admission  -Monitor for recurrence  - IVF for today     #Hypotension  -In patient with history of hypertension. Hold home meds and give IVF    #COPD  #Chronic hypoxic respiratory failure  - stable on home 2L. 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 Currently on no treatment     #Breast cancer  -Status post left mastectomy in 2007. On Arimidex. #Persistent atrial fibrillation  - cont dig.   No AC 2/2 bleeding issues in the past    Patient confirms that her code status is DNR CC    DVT Prophylaxis: Lovenox   Diet: DIET GENERAL;   Code Status: DNR-CC     Dispo: likely dc home in AM    Fred Mead PA-C  12/31/2019 1:18 PM

## 2019-12-31 NOTE — PROGRESS NOTES
Inpatient Physical Therapy Evaluation and Treatment    Unit: [facility]  Date:  12/31/2019  Patient Name:    Kurt Pappas \"Julianne\"  Admitting diagnosis:  UTI (urinary tract infection) [N39.0]  Admit Date:  12/31/2019  Precautions/Restrictions/WB Status/ Lines/ Wounds/ Oxygen: fall risk, IV, bed/chair alarm and supplemental O2 (2L), hx of L masectomy    Treatment Time:  9414-7727  Treatment Number:  1   Timed Code Treatment Minutes: 10 minutes  Total Treatment Minutes:  20  minutes    Patient Goals for Therapy: \" Go home \"          Discharge Recommendations: Home with 24/7 assist and home therapy  DME needs for discharge: defer to facility       Therapy recommendation for EMS Transport: can transport by wheelchair    Therapy recommendations for staff:   Stand by assist with use of IV pole for all transfers and ambulation to/from bathroom    Home Health S4 Level Recommendation:  Level 1 Standard  AM-PAC Mobility Score    AM-PAC Inpatient Mobility Raw Score : 20       Preadmission Environment    Pt. Lives with family (granddaughter). Patient has 24/hr assist   Home environment:    one story home  Steps to enter first floor:   2 steps to enter       Bathroom:       Tub/Shower unit, Shower Chair  and Standard height toilet  Equipment owned:      [facility], Rollator , manual W/C, electric WC and home O2 (2 L) PRN      Preadmission Status / PLOF:  History of falls             No  Pt. Able to drive          No  Pt Fully independent with ADL's         Yes  Pt. Required assistance from family for:  Cooking, Cleaning and Laundry   Pt. Fully independent for transfers and gait and walked with: Cane    Pain   No  Location:   Rating: NA /10  Pain Medicine Status: No request made    Cognition    A&O x4   Able to follow 2 step commands    Subjective  Patient lying supine in bed with RN present intermittently  Pt agreeable to this PT eval & tx. Upper Extremity ROM/Strength  Please see OT evaluation.       Lower Extremity ROM / Strength and Strength and decreased independence with Ambulation and Transfers. Pt currently requiring SBA for ambulation and intermittent bilateral UE support for stability with ambulation. Pt would benefit from continued acute PT services to safely progress IND with functional mobility. Goals : To be met in 3 visits:  1). Independent with LE Ex x 10 reps    To be met in 6 visits:  1). Supine to/from sit: Independent  2). Sit to/from stand: Supervision  3). Bed to chair: Supervision  4). Gait: Ambulate 150 ft.  with  Supervision  and use of LRAD  5). Tolerate B LE exercises 3 sets of 10-15 reps  6). Ascend/descend 2 steps with SBA     Rehabilitation Potential: Good  Strengths for achieving goals include:   PLOF and Pt cooperative  Barriers to achieving goals include:    Weakness    Plan    To be seen 3-5 x / week  while in acute care setting for therapeutic exercises, bed mobility, transfers, progressive gait training, balance training, and family/patient education. Garrett Padilla PT, DPT #[id number]    If patient discharges from this facility prior to next visit, this note will serve as the Discharge Summary.

## 2019-12-31 NOTE — ED NOTES
Bedside report given to unit nurse, Sidney Taylor. VSS, rocephin infusing. Transferred via wheelchair.       Nehemias Vee RN  12/31/19 1300

## 2019-12-31 NOTE — PROGRESS NOTES
Tone  Diminished    Balance  Functional Sitting Balance:  WFL  Functional Standing Balance:Diminished    Bed mobility:    Supine to sit:   Modified Independent  Sit to supine:   Modified Independent  Scooting to head of bed:   Modified Independent  Scooting in sitting:  Modified Independent  Rolling:   Not Tested  Bridging:   Modified Independent    Transfers:    Sit to stand:  SBA  Stand to sit:  SBA  Bed to [facility]:  Not Tested  Bed to chair:   Not Tested  Standard toilet: SBA and UE support on IV pole     Activity Tolerance   Pt completed therapy session with SOB noted with activity  SpO2: dropped to 85% with activity, on 2L of O2  HR:   BP:     Dressing:      UE:   Not Tested  LE:    SBA to don/doff pants, briefs, and shoes     Bathing:    UE:  Not Tested  LE:  Not Tested    Eating:   Independent    Toileting:  SBA for pericare after +BM    Positioning Needs: In bed, call light and needs in reach. Alarm Set    Exercise / Activities Initiated:   N/A    Patient/Family Education:   Role of OT  Recommendations for DC  Energy conservation techniques    Assessment of Deficits: Pt seen for Occupational therapy evaluation in acute care setting. Pt demonstrated decreased Activity Tolerance, ADL's, Balance, Bathing, Bed Mobility, Dressing, Safety Awareness, Strength and Transfers. Pt functioning below baseline and will likely benefit from skilled occupational therapy services to maximize safety and independence. Goal(s) : To be met in 3 Visits:  1). Bed to toilet/BSC: Supervision    To be met in 5 Visits:  1). Supine to Sit: Independent  2). Upper Body Bathing:  Independent  3). Lower Body Bathing:  Supervision  4). Upper Body Dressing: Independent  5). Lower Body Dressing: Supervision  6). Pt to jj UE exs x 15 reps    Rehabilitation Potential:  Good for goals listed above.     Strengths for achieving goals include: Pt motivated, PLOF and Pt cooperative  Barriers to achieving goals include:  Complexity of condition     Plan: To be seen 3-5 x/wk while in acute care setting for therapeutic exercises, bed mobility, transfers, dressing, bathing, family/patient education with adaptive equipment, breathing technique instruction.        Wei Frey, OTR/L #[id number]    If patient discharges from this facility prior to next visit, this note will serve as the Discharge Summary

## 2019-12-31 NOTE — ED PROVIDER NOTES
I independently examined and evaluated Nayeli Ramirez. In brief, patient presenting for evaluation of altered mental status and concern for dehydration. Patient had  diarrhea that started yesterday but today has been \"loopy\" per the daughter who is at bedside. Patient unable to really provided history as she keeps talking and somewhat delirious fashion, very focused on the IV and trying to stay still. Focused exam revealed patient is awake and alert. She is following my commands. However, she seems to continue to ask similar questions despite  receiving the answers to the questions, as if she is forgetting that she just asked that. She is moving all 4 extremities without gross focal deficit. Abdomen soft and nontender. .    ED course: Patient presenting for evaluation of diarrhea that started yesterday with possible dehydration. However, no significant evidence of dehydration noted. It was noted though the patient has evidence of significant urinary tract infection with  white blood cells in her urine. No leukocytosis or fever currently but I am concerned that patient has associated delirium with this UTI and would benefit from inpatient admission and antibiotics. We will consult the hospitalist for further management. All diagnostic, treatment, and disposition decisions were made by myself in conjunction with the advanced practice provider. For all further details of the patient's emergency department visit, please see the advanced practice provider's documentation. Comment: Please note this report has been produced using speech recognition software and may contain errors related to that system including errors in grammar, punctuation, and spelling, as well as words and phrases that may be inappropriate. If there are any questions or concerns please feel free to contact the dictating provider for clarification.         Claire Riggins MD  12/31/19 3965

## 2019-12-31 NOTE — ED PROVIDER NOTES
[address] ED  EMERGENCY DEPARTMENT ENCOUNTER        Pt Name: Gabby Wilkerson  MRN: [id number]  [place] 9/2/1932  Date of evaluation: 12/31/2019  Provider: RAJAT An CNP  PCP: RAJAT Robles CNP    This patient was seen and evaluated by the attending physician Dr. Demian Costello. CHIEF COMPLAINT       Chief Complaint   Patient presents with    Other     family called squad stating she may be dehydrated because she has diarrhea since day after edyta. daughter states that the other 3 people in the house have had diarrhea and vomiting for the last couple days. HISTORY OF PRESENT ILLNESS   (Location/Symptom, Timing/Onset, Context/Setting, Quality, Duration, Modifying Factors, Severity)  Note limiting factors. Gabby Wilkerson is a 80 y.o. female who presents for evaluation of dehydration. Daughter states that patient developed diarrhea the day after Edyta. States that everybody in the house has been sick with diarrhea and vomiting. States that patient has had one episode of diarrhea in the last 24 hours which was yesterday, patient states that her stool was not as loose as the previous days. Daughter states that patient has been confused/delirious the past 24 hours. Daughter thinks patient may have a urinary tract infection. Patient is on O2 which she is on at home due to having lung cancer. Patient denies chest pain, shortness of breath, fever, chills, nausea, and vomiting. Denies dysuria, hematuria, frequency, and urgency. Nursing Notes were all reviewed and agreed with or any disagreements were addressed in the HPI. REVIEW OF SYSTEMS    (2-9 systems for level 4, 10 or more for level 5)     Review of Systems    Positives and Pertinent negatives as per HPI. Except as noted above in the ROS, all other systems were reviewed and negative.        PAST MEDICAL HISTORY     Past Medical History:   Diagnosis Date    Atrial fibrillation ([facility] [address]     °F (37 °C) Oral 98 -- 99 % 5' 3\" (1.6 m) 140 lb (63.5 kg)       Physical Exam  Vitals signs and nursing note reviewed. Constitutional:       Appearance: She is well-developed. She is not diaphoretic. HENT:      Head: Normocephalic and atraumatic. Nose: Nose normal.   Eyes:      General:         Right eye: No discharge. Left eye: No discharge. Neck:      Musculoskeletal: Normal range of motion and neck supple. Cardiovascular:      Rate and Rhythm: Normal rate and regular rhythm. Pulses:           Radial pulses are 2+ on the right side and 2+ on the left side. Heart sounds: Normal heart sounds. Pulmonary:      Effort: Pulmonary effort is normal. No respiratory distress. Breath sounds: Normal breath sounds. Abdominal:      General: Bowel sounds are normal.      Palpations: Abdomen is soft. Musculoskeletal: Normal range of motion. Skin:     General: Skin is warm and dry. Neurological:      Mental Status: She is alert. She is confused. Comments: Alert to self and place.      Psychiatric:         Behavior: Behavior normal.         DIAGNOSTIC RESULTS   LABS:    Labs Reviewed   COMPREHENSIVE METABOLIC PANEL W/ REFLEX TO MG FOR LOW K - Abnormal; Notable for the following components:       Result Value    Chloride 97 (*)     All other components within normal limits    Narrative:     Performed at:  [facility],  [employer], [facility]   Phone [phone]   URINE RT REFLEX TO CULTURE - Abnormal; Notable for the following components:    Clarity, UA SL CLOUDY (*)     Leukocyte Esterase, Urine SMALL (*)     All other components within normal limits    Narrative:     Performed at:  [facility],  [place], [facility]   Phone [phone]   MICROSCOPIC URINALYSIS - Abnormal; Notable for the following components:    Mucus, UA 1+ (*)     WBC, UA  (*)     Bacteria, UA Rare (*) All other components within normal limits    Narrative:     Performed at:  [facility] ([facility] - [facility],  [place], [facility]   Phone [phone]   URINE CULTURE   CBC WITH AUTO DIFFERENTIAL    Narrative:     Performed at:  [facility]) - [facility],  [place], [place]   Phone [phone]       All other labs were within normal range or not returned as of this dictation. EKG: All EKG's are interpreted by the Emergency Department Physician in the absence of a cardiologist.  Please see their note for interpretation of EKG. RADIOLOGY:   Non-plain film images such as CT, Ultrasound and MRI are read by the radiologist. Plain radiographic images are visualized and preliminarily interpreted by the  ED Provider with the below findings:        Interpretation per the Radiologist below, if available at the time of this note:    No orders to display     No results found. PROCEDURES   Unless otherwise noted below, none     Procedures    CRITICAL CARE TIME   N/A    CONSULTS:  IP CONSULT TO HOSPITALIST      EMERGENCY DEPARTMENT COURSE and DIFFERENTIAL DIAGNOSIS/MDM:   Vitals:    Vitals:    12/31/19 1027   BP: 96/75   Pulse: 98   Temp: 98.6 °F (37 °C)   TempSrc: Oral   SpO2: 99%   Weight: 140 lb (63.5 kg)   Height: 5' 3\" (1.6 m)       Patient was given thefollowing medications:  Medications   cefTRIAXone (ROCEPHIN) 1 g IVPB in 50 mL D5W minibag (has no administration in time range)   0.9 % sodium chloride bolus (1,000 mLs Intravenous New Bag 12/31/19 1129)     Patient is nontoxic, in no apparent distress, lying on the bed. Lab work and urinalysis ordered. Patient given 1 L of normal saline. CBC and CMP unremarkable, urinalysis positive for a urinary tract infection, patient to be treated with Rocephin. Hospitalist consulted at this time.     1155 -patient and family updated at this time the patient has a urinary tract infection and

## 2019-12-31 NOTE — PROGRESS NOTES
Pt admitted to [place] [address] from ER. Pt was oriented to room and unit. Pt update on present plan of care. Pt questions and concerns answered. Pt can be pleasantly confused at time during conversation but reorients easily. Pt provided history to best of her ability but defers medication list to daughter Saurabh Hayden, attempted x 2 unsuccessfully to contact t/o shift. Pt using call light appropriately. SR up x 2. Bed alarm activated for pt safety r/t confusion.

## 2019-12-31 NOTE — CARE COORDINATION
Case Management Assessment  Initial Evaluation    Date/Time of Evaluation: 12/31/2019 3:14 PM  Assessment Completed by: Irene Reyna    Patient Name: Gabby Wilkerson  YOB: 1932  Diagnosis: UTI (urinary tract infection) [N39.0]  Date / Time: 12/31/2019 10:09 AM  Admission status/Date: OBS  Chart Reviewed: Yes      Patient Interviewed: Yes   Family Interviewed:  No      Hospitalization in the last 30 days:  No    Contacts  :   Mariam Toledo  Relationship to Patient:  daughter  Phone Number:   [phone]  Alternate Contact:   Chung Hughes  Relationship to Patient:   daughter  Phone Number:   [phone]    Met with: patient  Current PCP: SKYLAR Davis    Financial  Medicare/[employer]  Precert required for SNF : NA      3 night stay required - YES    ADLS  Support Systems:    Transportation: family    Meal Preparation: family    Housing  Home Environment: lives w/ grand daughter  Steps: 2 steps  Plans to Return to Present Housing: Yes  Other Identified Issues: DERRICK Hawley  Currently active with [address] : No     Passport/Waiver : No  :                      Phone Number:    Passport/Waiver Services: NA          Durable Medical Equipment   DME Provider: [address]: Walker___Cane___RTS___ BSC___Shower Chair___  02_X_ at _2__Liter(s)---Uses_continuously_______HHN___ CPAP___ BiPap___ Hospital Bed___W/C____Other_+stationary and portable concentrators and portable tanks_______      Has Home O2 in place on admit:  Yes  Informed of need to bring portable home O2 tank on day of discharge for nursing to connect prior to leaving:   Yes  Verbalized agreement/Understanding:   Yes    Community Service Affiliation  Dialysis:  No    · Name:  · Location  · Dialysis Schedule:  · Phone:   · Fax:     Outpatient PT/OT: No    Cancer Center: No     CHF Clinic: No     Pulmonary Rehab: No  Pain Clinic: No  Community Mental Health: No    Wound Clinic: No     Other: NA    DISCHARGE PLAN: Chart reviewed. Met with pt at bedside and explained the role of the CM. Plans to DC home. Considering HC. +Currently in OBS status. Will need 3 qualifying inpatient days for rehab. Discussed home w/HC vs SNF and pt understands that she does not meet criteria for inpatient status. +CM following for possible HC needs. Explained Case Management role/services.

## 2019-12-31 NOTE — PLAN OF CARE
Admit 2W Obs   UTI with mild AMS--vitals stable, Rocephin   Had diarrhea for a while but now resolved   BP low, hold home HTN medications

## 2020-01-01 ENCOUNTER — APPOINTMENT (OUTPATIENT)
Dept: CT IMAGING | Age: 85
DRG: 066 | End: 2020-01-01
Payer: MEDICARE

## 2020-01-01 ENCOUNTER — HOSPITAL ENCOUNTER (INPATIENT)
Age: 85
LOS: 2 days | DRG: 066 | End: 2020-03-12
Attending: EMERGENCY MEDICINE | Admitting: HOSPITALIST
Payer: MEDICARE

## 2020-01-01 ENCOUNTER — APPOINTMENT (OUTPATIENT)
Dept: GENERAL RADIOLOGY | Age: 85
DRG: 066 | End: 2020-01-01
Payer: MEDICARE

## 2020-01-01 VITALS
TEMPERATURE: 97.4 F | RESPIRATION RATE: 16 BRPM | DIASTOLIC BLOOD PRESSURE: 83 MMHG | WEIGHT: 140 LBS | HEART RATE: 99 BPM | HEIGHT: 63 IN | BODY MASS INDEX: 24.8 KG/M2 | OXYGEN SATURATION: 98 % | SYSTOLIC BLOOD PRESSURE: 158 MMHG

## 2020-01-01 VITALS
RESPIRATION RATE: 20 BRPM | OXYGEN SATURATION: 90 % | HEART RATE: 148 BPM | HEIGHT: 66 IN | WEIGHT: 138.5 LBS | TEMPERATURE: 101.5 F | SYSTOLIC BLOOD PRESSURE: 157 MMHG | BODY MASS INDEX: 22.26 KG/M2 | DIASTOLIC BLOOD PRESSURE: 91 MMHG

## 2020-01-01 LAB
A/G RATIO: 1.5 (ref 1.1–2.2)
ALBUMIN SERPL-MCNC: 4.1 G/DL (ref 3.4–5)
ALP BLD-CCNC: 98 U/L (ref 40–129)
ALT SERPL-CCNC: 15 U/L (ref 10–40)
ANION GAP SERPL CALCULATED.3IONS-SCNC: 12 MMOL/L (ref 3–16)
ANION GAP SERPL CALCULATED.3IONS-SCNC: 6 MMOL/L (ref 3–16)
AST SERPL-CCNC: 23 U/L (ref 15–37)
BASOPHILS ABSOLUTE: 0 K/UL (ref 0–0.2)
BASOPHILS ABSOLUTE: 0 K/UL (ref 0–0.2)
BASOPHILS RELATIVE PERCENT: 0.4 %
BASOPHILS RELATIVE PERCENT: 0.5 %
BILIRUB SERPL-MCNC: 0.5 MG/DL (ref 0–1)
BUN BLDV-MCNC: 11 MG/DL (ref 7–20)
BUN BLDV-MCNC: 12 MG/DL (ref 7–20)
CALCIUM SERPL-MCNC: 7.8 MG/DL (ref 8.3–10.6)
CALCIUM SERPL-MCNC: 9.5 MG/DL (ref 8.3–10.6)
CHLORIDE BLD-SCNC: 97 MMOL/L (ref 99–110)
CHLORIDE BLD-SCNC: 99 MMOL/L (ref 99–110)
CO2: 30 MMOL/L (ref 21–32)
CO2: 33 MMOL/L (ref 21–32)
CREAT SERPL-MCNC: <0.5 MG/DL (ref 0.6–1.2)
CREAT SERPL-MCNC: <0.5 MG/DL (ref 0.6–1.2)
DIGOXIN LEVEL: <0.3 NG/ML (ref 0.8–2)
EKG ATRIAL RATE: 107 BPM
EKG DIAGNOSIS: NORMAL
EKG Q-T INTERVAL: 386 MS
EKG QRS DURATION: 80 MS
EKG QTC CALCULATION (BAZETT): 492 MS
EKG R AXIS: -8 DEGREES
EKG T AXIS: 28 DEGREES
EKG VENTRICULAR RATE: 98 BPM
EOSINOPHILS ABSOLUTE: 0.1 K/UL (ref 0–0.6)
EOSINOPHILS ABSOLUTE: 0.1 K/UL (ref 0–0.6)
EOSINOPHILS RELATIVE PERCENT: 1.6 %
EOSINOPHILS RELATIVE PERCENT: 3.1 %
GFR AFRICAN AMERICAN: >60
GFR AFRICAN AMERICAN: >60
GFR NON-AFRICAN AMERICAN: >60
GFR NON-AFRICAN AMERICAN: >60
GLOBULIN: 2.8 G/DL
GLUCOSE BLD-MCNC: 162 MG/DL (ref 70–99)
GLUCOSE BLD-MCNC: 99 MG/DL (ref 70–99)
HCT VFR BLD CALC: 32.8 % (ref 36–48)
HCT VFR BLD CALC: 39.2 % (ref 36–48)
HEMOGLOBIN: 10.9 G/DL (ref 12–16)
HEMOGLOBIN: 13 G/DL (ref 12–16)
INR BLD: 1.01 (ref 0.86–1.14)
LYMPHOCYTES ABSOLUTE: 1.2 K/UL (ref 1–5.1)
LYMPHOCYTES ABSOLUTE: 2.4 K/UL (ref 1–5.1)
LYMPHOCYTES RELATIVE PERCENT: 29.2 %
LYMPHOCYTES RELATIVE PERCENT: 31 %
MAGNESIUM: 1.8 MG/DL (ref 1.8–2.4)
MCH RBC QN AUTO: 29.4 PG (ref 26–34)
MCH RBC QN AUTO: 30.3 PG (ref 26–34)
MCHC RBC AUTO-ENTMCNC: 33.1 G/DL (ref 31–36)
MCHC RBC AUTO-ENTMCNC: 33.4 G/DL (ref 31–36)
MCV RBC AUTO: 88.9 FL (ref 80–100)
MCV RBC AUTO: 90.7 FL (ref 80–100)
MONOCYTES ABSOLUTE: 0.5 K/UL (ref 0–1.3)
MONOCYTES ABSOLUTE: 0.5 K/UL (ref 0–1.3)
MONOCYTES RELATIVE PERCENT: 13.4 %
MONOCYTES RELATIVE PERCENT: 6.1 %
NEUTROPHILS ABSOLUTE: 2.2 K/UL (ref 1.7–7.7)
NEUTROPHILS ABSOLUTE: 4.8 K/UL (ref 1.7–7.7)
NEUTROPHILS RELATIVE PERCENT: 53.8 %
NEUTROPHILS RELATIVE PERCENT: 60.9 %
PDW BLD-RTO: 13.9 % (ref 12.4–15.4)
PDW BLD-RTO: 14.7 % (ref 12.4–15.4)
PLATELET # BLD: 123 K/UL (ref 135–450)
PLATELET # BLD: 165 K/UL (ref 135–450)
PMV BLD AUTO: 9 FL (ref 5–10.5)
PMV BLD AUTO: 9.2 FL (ref 5–10.5)
POTASSIUM REFLEX MAGNESIUM: 3.2 MMOL/L (ref 3.5–5.1)
POTASSIUM SERPL-SCNC: 3.6 MMOL/L (ref 3.5–5.1)
PROTHROMBIN TIME: 11.7 SEC (ref 10–13.2)
RBC # BLD: 3.62 M/UL (ref 4–5.2)
RBC # BLD: 4.41 M/UL (ref 4–5.2)
SODIUM BLD-SCNC: 133 MMOL/L (ref 136–145)
SODIUM BLD-SCNC: 144 MMOL/L (ref 136–145)
TOTAL PROTEIN: 6.9 G/DL (ref 6.4–8.2)
TROPONIN: <0.01 NG/ML
URINE CULTURE, ROUTINE: NORMAL
WBC # BLD: 4 K/UL (ref 4–11)
WBC # BLD: 7.9 K/UL (ref 4–11)

## 2020-01-01 PROCEDURE — 71045 X-RAY EXAM CHEST 1 VIEW: CPT

## 2020-01-01 PROCEDURE — 99232 SBSQ HOSP IP/OBS MODERATE 35: CPT | Performed by: INTERNAL MEDICINE

## 2020-01-01 PROCEDURE — 36415 COLL VENOUS BLD VENIPUNCTURE: CPT

## 2020-01-01 PROCEDURE — 6370000000 HC RX 637 (ALT 250 FOR IP): Performed by: HOSPITALIST

## 2020-01-01 PROCEDURE — 80162 ASSAY OF DIGOXIN TOTAL: CPT

## 2020-01-01 PROCEDURE — 2580000003 HC RX 258: Performed by: PHYSICIAN ASSISTANT

## 2020-01-01 PROCEDURE — 6370000000 HC RX 637 (ALT 250 FOR IP): Performed by: PHYSICIAN ASSISTANT

## 2020-01-01 PROCEDURE — 1200000000 HC SEMI PRIVATE

## 2020-01-01 PROCEDURE — 80048 BASIC METABOLIC PNL TOTAL CA: CPT

## 2020-01-01 PROCEDURE — 6360000002 HC RX W HCPCS: Performed by: HOSPITALIST

## 2020-01-01 PROCEDURE — 80053 COMPREHEN METABOLIC PANEL: CPT

## 2020-01-01 PROCEDURE — 99217 PR OBSERVATION CARE DISCHARGE MANAGEMENT: CPT | Performed by: INTERNAL MEDICINE

## 2020-01-01 PROCEDURE — 93005 ELECTROCARDIOGRAM TRACING: CPT | Performed by: EMERGENCY MEDICINE

## 2020-01-01 PROCEDURE — 2580000003 HC RX 258: Performed by: HOSPITALIST

## 2020-01-01 PROCEDURE — 6360000002 HC RX W HCPCS: Performed by: EMERGENCY MEDICINE

## 2020-01-01 PROCEDURE — 96375 TX/PRO/DX INJ NEW DRUG ADDON: CPT

## 2020-01-01 PROCEDURE — 85025 COMPLETE CBC W/AUTO DIFF WBC: CPT

## 2020-01-01 PROCEDURE — 85610 PROTHROMBIN TIME: CPT

## 2020-01-01 PROCEDURE — 6360000002 HC RX W HCPCS

## 2020-01-01 PROCEDURE — G0378 HOSPITAL OBSERVATION PER HR: HCPCS

## 2020-01-01 PROCEDURE — 6360000002 HC RX W HCPCS: Performed by: PHYSICIAN ASSISTANT

## 2020-01-01 PROCEDURE — 6370000000 HC RX 637 (ALT 250 FOR IP): Performed by: EMERGENCY MEDICINE

## 2020-01-01 PROCEDURE — 83735 ASSAY OF MAGNESIUM: CPT

## 2020-01-01 PROCEDURE — 6370000000 HC RX 637 (ALT 250 FOR IP): Performed by: INTERNAL MEDICINE

## 2020-01-01 PROCEDURE — 2580000003 HC RX 258: Performed by: EMERGENCY MEDICINE

## 2020-01-01 PROCEDURE — 96376 TX/PRO/DX INJ SAME DRUG ADON: CPT

## 2020-01-01 PROCEDURE — 70450 CT HEAD/BRAIN W/O DYE: CPT

## 2020-01-01 PROCEDURE — 99285 EMERGENCY DEPT VISIT HI MDM: CPT

## 2020-01-01 PROCEDURE — 84484 ASSAY OF TROPONIN QUANT: CPT

## 2020-01-01 PROCEDURE — 96374 THER/PROPH/DIAG INJ IV PUSH: CPT

## 2020-01-01 PROCEDURE — 93010 ELECTROCARDIOGRAM REPORT: CPT | Performed by: INTERNAL MEDICINE

## 2020-01-01 RX ORDER — ACETAMINOPHEN 650 MG/1
650 SUPPOSITORY RECTAL EVERY 6 HOURS PRN
Status: DISCONTINUED | OUTPATIENT
Start: 2020-01-01 | End: 2020-01-01 | Stop reason: HOSPADM

## 2020-01-01 RX ORDER — MORPHINE SULFATE 4 MG/ML
4 INJECTION, SOLUTION INTRAMUSCULAR; INTRAVENOUS ONCE
Status: COMPLETED | OUTPATIENT
Start: 2020-01-01 | End: 2020-01-01

## 2020-01-01 RX ORDER — MORPHINE SULFATE 2 MG/ML
2 INJECTION, SOLUTION INTRAMUSCULAR; INTRAVENOUS
Status: DISCONTINUED | OUTPATIENT
Start: 2020-01-01 | End: 2020-01-01 | Stop reason: HOSPADM

## 2020-01-01 RX ORDER — SODIUM CHLORIDE 0.9 % (FLUSH) 0.9 %
10 SYRINGE (ML) INJECTION PRN
Status: DISCONTINUED | OUTPATIENT
Start: 2020-01-01 | End: 2020-01-01 | Stop reason: HOSPADM

## 2020-01-01 RX ORDER — ONDANSETRON 2 MG/ML
4 INJECTION INTRAMUSCULAR; INTRAVENOUS EVERY 6 HOURS PRN
Status: DISCONTINUED | OUTPATIENT
Start: 2020-01-01 | End: 2020-01-01 | Stop reason: HOSPADM

## 2020-01-01 RX ORDER — LORAZEPAM 2 MG/ML
1 INJECTION INTRAMUSCULAR EVERY 6 HOURS PRN
Status: DISCONTINUED | OUTPATIENT
Start: 2020-01-01 | End: 2020-01-01 | Stop reason: HOSPADM

## 2020-01-01 RX ORDER — SODIUM CHLORIDE 9 MG/ML
INJECTION, SOLUTION INTRAVENOUS CONTINUOUS
Status: DISCONTINUED | OUTPATIENT
Start: 2020-01-01 | End: 2020-01-01

## 2020-01-01 RX ORDER — CEFUROXIME AXETIL 500 MG/1
500 TABLET ORAL 2 TIMES DAILY
Qty: 6 TABLET | Refills: 0 | Status: SHIPPED | OUTPATIENT
Start: 2020-01-01 | End: 2020-01-01

## 2020-01-01 RX ORDER — POLYETHYLENE GLYCOL 3350 17 G/17G
17 POWDER, FOR SOLUTION ORAL DAILY PRN
Status: DISCONTINUED | OUTPATIENT
Start: 2020-01-01 | End: 2020-01-01 | Stop reason: HOSPADM

## 2020-01-01 RX ORDER — POTASSIUM CHLORIDE 750 MG/1
40 TABLET, EXTENDED RELEASE ORAL ONCE
Status: COMPLETED | OUTPATIENT
Start: 2020-01-01 | End: 2020-01-01

## 2020-01-01 RX ORDER — ONDANSETRON 4 MG/1
4 TABLET, ORALLY DISINTEGRATING ORAL ONCE
Status: COMPLETED | OUTPATIENT
Start: 2020-01-01 | End: 2020-01-01

## 2020-01-01 RX ORDER — SODIUM CHLORIDE 0.9 % (FLUSH) 0.9 %
10 SYRINGE (ML) INJECTION EVERY 12 HOURS SCHEDULED
Status: DISCONTINUED | OUTPATIENT
Start: 2020-01-01 | End: 2020-01-01 | Stop reason: HOSPADM

## 2020-01-01 RX ORDER — ACETAMINOPHEN 325 MG/1
650 TABLET ORAL EVERY 6 HOURS PRN
Status: DISCONTINUED | OUTPATIENT
Start: 2020-01-01 | End: 2020-01-01 | Stop reason: HOSPADM

## 2020-01-01 RX ORDER — ONDANSETRON 2 MG/ML
4 INJECTION INTRAMUSCULAR; INTRAVENOUS ONCE
Status: COMPLETED | OUTPATIENT
Start: 2020-01-01 | End: 2020-01-01

## 2020-01-01 RX ORDER — PROMETHAZINE HYDROCHLORIDE 25 MG/1
12.5 TABLET ORAL EVERY 6 HOURS PRN
Status: DISCONTINUED | OUTPATIENT
Start: 2020-01-01 | End: 2020-01-01 | Stop reason: HOSPADM

## 2020-01-01 RX ORDER — MORPHINE SULFATE 4 MG/ML
INJECTION, SOLUTION INTRAMUSCULAR; INTRAVENOUS
Status: COMPLETED
Start: 2020-01-01 | End: 2020-01-01

## 2020-01-01 RX ADMIN — Medication 10 ML: at 14:02

## 2020-01-01 RX ADMIN — MORPHINE SULFATE 4 MG: 4 INJECTION, SOLUTION INTRAMUSCULAR; INTRAVENOUS at 19:30

## 2020-01-01 RX ADMIN — ACETAMINOPHEN 650 MG: 650 SUPPOSITORY RECTAL at 20:17

## 2020-01-01 RX ADMIN — MORPHINE SULFATE: 4 INJECTION, SOLUTION INTRAMUSCULAR; INTRAVENOUS at 19:48

## 2020-01-01 RX ADMIN — ANASTROZOLE 0.5 MG: 1 TABLET ORAL at 10:49

## 2020-01-01 RX ADMIN — SODIUM CHLORIDE 125 ML/HR: 9 INJECTION, SOLUTION INTRAVENOUS at 18:59

## 2020-01-01 RX ADMIN — SODIUM CHLORIDE: 9 INJECTION, SOLUTION INTRAVENOUS at 03:20

## 2020-01-01 RX ADMIN — LORAZEPAM 1 MG: 2 INJECTION INTRAMUSCULAR at 03:20

## 2020-01-01 RX ADMIN — LORAZEPAM 1 MG: 2 INJECTION INTRAMUSCULAR at 09:07

## 2020-01-01 RX ADMIN — ACETAMINOPHEN 650 MG: 650 SUPPOSITORY RECTAL at 09:07

## 2020-01-01 RX ADMIN — MORPHINE SULFATE 2 MG: 2 INJECTION, SOLUTION INTRAMUSCULAR; INTRAVENOUS at 14:02

## 2020-01-01 RX ADMIN — POTASSIUM CHLORIDE 40 MEQ: 750 TABLET, EXTENDED RELEASE ORAL at 14:18

## 2020-01-01 RX ADMIN — PAROXETINE HYDROCHLORIDE 40 MG: 20 TABLET, FILM COATED ORAL at 10:41

## 2020-01-01 RX ADMIN — LORAZEPAM 1 MG: 2 INJECTION INTRAMUSCULAR at 17:30

## 2020-01-01 RX ADMIN — MORPHINE SULFATE 2 MG: 2 INJECTION, SOLUTION INTRAMUSCULAR; INTRAVENOUS at 20:18

## 2020-01-01 RX ADMIN — LORAZEPAM 1 MG: 2 INJECTION INTRAMUSCULAR at 01:49

## 2020-01-01 RX ADMIN — Medication 10 ML: at 20:18

## 2020-01-01 RX ADMIN — DIGOXIN 250 MCG: 250 TABLET ORAL at 10:41

## 2020-01-01 RX ADMIN — ONDANSETRON 4 MG: 2 INJECTION, SOLUTION INTRAMUSCULAR; INTRAVENOUS at 18:49

## 2020-01-01 RX ADMIN — DEXTROSE MONOHYDRATE 1 G: 5 INJECTION INTRAVENOUS at 12:15

## 2020-01-01 RX ADMIN — Medication 10 ML: at 10:17

## 2020-01-01 RX ADMIN — ACETAMINOPHEN 650 MG: 650 SUPPOSITORY RECTAL at 03:30

## 2020-01-01 RX ADMIN — ONDANSETRON 4 MG: 4 TABLET, ORALLY DISINTEGRATING ORAL at 19:49

## 2020-01-01 RX ADMIN — Medication 10 ML: at 10:42

## 2020-01-01 RX ADMIN — MORPHINE SULFATE 2 MG: 2 INJECTION, SOLUTION INTRAMUSCULAR; INTRAVENOUS at 03:21

## 2020-01-01 ASSESSMENT — PAIN SCALES - GENERAL
PAINLEVEL_OUTOF10: 8
PAINLEVEL_OUTOF10: 3
PAINLEVEL_OUTOF10: 0
PAINLEVEL_OUTOF10: 4
PAINLEVEL_OUTOF10: 7
PAINLEVEL_OUTOF10: 6
PAINLEVEL_OUTOF10: 2

## 2020-01-01 NOTE — DISCHARGE INSTR - COC
Continuity of Care Form    Patient Name: Samara Diaz   :  1932  MRN:  [id number]    [facility] date:  2019  Discharge date:  20      Code Status Order: DNR-CC   Advance Directives:     Admitting Physician:  Saadia Bourne MD  PCP: RAJAT Lindo - CNP    Discharging Nurse: [facility] Unit/Room#: 0213/0213-02  Discharging Unit Phone Number: [phone]    Emergency Contact:   Extended Emergency Contact Information  Primary Emergency Contact: Gloria Love  Address: [address]           [place] [address] [facility] Phone: [phone]  Mobile Phone: [phone]  Relation: Child  Secondary Emergency Contact: Tonya Hurley [address] Phone: [phone]  Relation: Child    Past Surgical History:  Past Surgical History:   Procedure Laterality Date    CHOLECYSTECTOMY      HYSTERECTOMY      MASTECTOMY  LEFT BREAST       Immunization History:   Immunization History   Administered Date(s) Administered    Pneumococcal Conjugate Vaccine 2015       Active Problems:  Patient Active Problem List   Diagnosis Code    Non-small cell cancer of right lung ([facility] [address] C34.91    Bronchitis J40    COPD exacerbation ([address] J44.1    COPD (chronic obstructive pulmonary disease) ([address] J44.9    Tracheobronchitis J40    SOB (shortness of breath) R06.02    Hypoxia R09.02    Tobacco abuse Z72.0    Atrial fibrillation (HCC) I48.91    UTI (urinary tract infection) N39.0    Chronic respiratory failure with hypoxia ([address] J96.11    Hypertension I10    Breast cancer ([facility] [address] C50.919       Isolation/Infection:   Isolation          No Isolation        Patient Infection Status     Infection Onset Added Last Indicated Last Indicated By Review Planned Expiration Resolved Resolved By    None active    Resolved    C-diff Rule Out  19 Clostridium difficile toxin/antigen (Ordered)   20 Ree Love RN          Nurse Assessment:  Last Vital Signs: BP (!) 158/83   Pulse 99   Temp 97.4 °F (36.3 °C) (Oral)   Resp 16   Ht 5' 3\" (1.6 m)   Wt 140 lb (63.5 kg)   SpO2 98%   BMI 24.80 kg/m²     Last documented pain score (0-10 scale):    Last Weight:   Wt Readings from Last 1 Encounters:   12/31/19 140 lb (63.5 kg)     Mental Status:  oriented and alert    IV Access:  - None    Nursing Mobility/ADLs:  Walking   Independent  Transfer  Independent  Bathing  Independent  Dressing  Independent  Toileting  Independent  Feeding  Independent  Med Admin  Assisted  Med Delivery   whole    Wound Care Documentation and Therapy:        Elimination:  Continence:   · Bowel: Yes  · Bladder: Yes, leakage at times  Urinary Catheter: None   Colostomy/Ileostomy/Ileal Conduit: No       Date of Last BM: 1-1-20    Intake/Output Summary (Last 24 hours) at 1/1/2020 1444  Last data filed at 1/1/2020 8788  Gross per 24 hour   Intake 1379 ml   Output --   Net 1379 ml     I/O last 3 completed shifts: In: 0543 [P.O.:120; I.V.:1259]  Out: -     Safety Concerns:     None    Impairments/Disabilities:      None    Nutrition Therapy:  Current Nutrition Therapy:   - Oral Diet:  General    Routes of Feeding: Oral  Liquids: Thin Liquids  Daily Fluid Restriction: no  Last Modified Barium Swallow with Video (Video Swallowing Test): not done    Treatments at the Time of Hospital Discharge:   Respiratory Treatments: none  Oxygen Therapy:  is on oxygen at 2 L/min per nasal cannula.   Ventilator:    - No ventilator support    Rehab Therapies: Physical Therapy and Occupational Therapy  Weight Bearing Status/Restrictions: No weight bearing restirctions  Other Medical Equipment (for information only, NOT a DME order):  none  Other Treatments: none    Patient's personal belongings (please select all that are sent with patient):  Glasses, Dentures upper and lower    RN SIGNATURE:  Electronically signed by Katharina Hameed RN on 1/1/20 at 2:46 PM    CASE MANAGEMENT/SOCIAL WORK

## 2020-01-01 NOTE — CARE COORDINATION
DISCHARGE ORDER  Date/Time 2020 3:00 PM  Completed by: Irene Reyna, Case Management    Patient Name: Gabby Wilkerson    : 1932  Admitting Diagnosis: UTI (urinary tract infection) [N39.0]      Admit order Date and Status: OBS  (verify MD's last order for status of admission)      Noted discharge order. Confirmed discharge plan with patient / family (pt): Yes    If pt confirmed DC plan does family need to be contacted by CM No, daughter at bedside. Discharge Plan: home w/ HC-SN/PT/OT/MSW        Reviewed chart. Role of discharge planner explained and patient verbalized understanding. Discharge order is noted. Has Home O2 in place on admit:  YES, [employer] 2 liters baseline  Informed of need to bring portable home O2 tank on day of discharge for nursing to connect prior to leaving:  YES  Verbalized agreement/Understanding:  YES  Pt is being d/c'd to home w/[facility], referral AVS/MARCY today. Pt's O2 sats are 98% on 2 liters NS    Discharge timeout done with [facility]. All discharge needs and concerns addressed.

## 2020-01-01 NOTE — PROGRESS NOTES
Pt discharged to home with 2L o2, own portable tank at bedside. Pt and family would like home PT OT home health.  notified. PIV removed. Discharge instructions reviewed with pt and family at bedside. Pt will leave with wheelchair via transport home with family private vehicle.

## 2020-01-01 NOTE — PROGRESS NOTES
Awake in bed. PM assessment complete. Lian meds given. Pt is forgetful and arguing that she has been here \"too many nights\". Informed pt she just came today & pt becomes upset bc \"I know when I got here\". Pt has also been setting off bed alarm frequently & not using call light. Pt moved from 204 to 213 for pt safety. No other needs voiced. Call light in reach. Bed alarm in place. Will cont to monitor.  [place]

## 2020-01-01 NOTE — PROGRESS NOTES
4 Eyes Skin Assessment     The patient is being assess for   Admission    I agree that 2 RN's have performed a thorough Head to Toe Skin Assessment on the patient. ALL assessment sites listed below have been assessed. Areas assessed by both nurses:   [x]   Head, Face, and Ears   [x]   Shoulders, Back, and Chest, Abdomen  [x]   Arms, Elbows, and Hands   [x]   Coccyx, Sacrum, and Ischium  [x]   Legs, Feet, and Heels        No skin issues    **SHARE this note so that the co-signing nurse is able to place an eSignature**    Co-signer eSignature: Electronically signed by Alonzo Ghosh RN on 12/31/19 at 7:10 PM    Does the Patient have Skin Breakdown?   No          Seyd Prevention initiated:  No   Wound Care Orders initiated:  No      [facility] nurse consulted for Pressure Injury (Stage 3,4, Unstageable, DTI, NWPT, Complex wounds)and New or Established Ostomies:  No      Primary Nurse eSignature: Electronically signed by Yony Dalton RN on 12/31/19 at 7:05 PM

## 2020-01-01 NOTE — PLAN OF CARE
Problem: Falls - Risk of:  Goal: Will remain free from falls  Description  Will remain free from falls  Outcome: Ongoing  Goal: Absence of physical injury  Description  Absence of physical injury  Outcome: Ongoing     Problem: Safety:  Goal: Free from accidental physical injury  Outcome: Ongoing  Goal: Free from intentional harm  Outcome: Ongoing     Problem: Daily Care:  Goal: Daily care needs are met  Description  Daily care needs are met  Outcome: Ongoing     Problem: Infection:  Goal: Will remain free from infection  Description  Will remain free from infection  Outcome: Ongoing

## 2020-01-31 PROBLEM — N39.0 UTI (URINARY TRACT INFECTION): Status: RESOLVED | Noted: 2019-01-01 | Resolved: 2020-01-01

## 2020-03-10 PROBLEM — I62.9 INTRACRANIAL HEMORRHAGE (HCC): Status: ACTIVE | Noted: 2020-01-01

## 2020-03-10 PROBLEM — I62.9 INTRACRANIAL BLEED (HCC): Status: ACTIVE | Noted: 2020-01-01

## 2020-03-10 NOTE — ED NOTES
Neurosurgery at [facility] paged via transfer center      [facility] Day  03/10/20 9694

## 2020-03-10 NOTE — ED PROVIDER NOTES
[address] [facility]      Pt Name: Areli Bartlett  MRN: [id number]  [place] 9/2/1932  Date of evaluation: 3/10/2020  Provider: Natividad Berman MD    CHIEF COMPLAINT       Chief Complaint   Patient presents with    Cerebrovascular Accident     pt was sitting on the couch talking to family and went  unresponsive. 911 called . pt has a DNRcc . pt unable to move left side and has vomited x 2. HISTORY OF PRESENT ILLNESS   (Location/Symptom, Timing/Onset, Context/Setting, Quality, Duration, Modifying Factors, Severity)  Note limiting factors. Areli Bartlett is a 80 y.o. female who presents to the emergency department     This patient presented suddenly by EMS apparently at about [id number] while sitting on the couch she became suddenly unresponsive with no movement of the left arm and left leg  Her gaze fixed to the right EMS was called who proceeded here they did call her as a code stroke  We learned that she had a history of atrial fib she was not anticoagulated so we thought that maybe she might be a thrombotic or embolic event however when we got her CT we revealed a large intracerebral bleed with this shift. Patient normally is right-handed  She is continued to move her right hand her gaze is fixed to the right  Patient is unresponsive patient did vomit in the CT scanner once we did have suction available right away  There was no chance of aspiration  She had been feeling fine earlier today    She has history of breast cancer 2007  She recently was diagnosed with a primary lung cancer apparently was continuing to be a smoker.   The PET scan done last fall apparently showed no metastatic lesions to the brain according to the 1 daughter  She denied headache prior to the onset of this  She does have a history of hypertensive  She had been on digoxin but this had been discontinued  She is not on any blood thinners at all  I did find in her records that she is a DNR CC  I did asked the family this and they did do endorse that she is a DNR CCwe briefly talked about intubation all parties in the room felt that she was not a candidate for intubation I will contact and reach out to our neurosurgical staff to see if they feel that there is any viability here and this will determine disposition        The history is provided by the EMS personnel and a relative. Nursing Notes were reviewed. REVIEW OF SYSTEMS    (2-9 systems for level 4, 10 or more for level 5)     Review of Systems   Unable to perform ROS: Mental status change   Constitutional: Positive for activity change. Except as noted above the remainder of the review of systems was reviewed and negative. PAST MEDICAL HISTORY     Past Medical History:   Diagnosis Date    [facility]     [facility]     COPD (chronic obstructive pulmonary disease) ([facility]     Hypertension     Lung cancer ([facility]          SURGICAL HISTORY       Past Surgical History:   Procedure Laterality Date    CHOLECYSTECTOMY      HYSTERECTOMY      MASTECTOMY  LEFT BREAST         CURRENT MEDICATIONS       Previous Medications    ANASTROZOLE (ARIMIDEX) 1 MG TABLET    1 mg Indications: half of pill daily     ATENOLOL (TENORMIN) 25 MG TABLET    TAKE 1/2 tablet by MOUTH DAILY    DIGOXIN (LANOXIN) 250 MCG TABLET    TAKE 1 TABLET BY MOUTH DAILY    LISINOPRIL-HYDROCHLOROTHIAZIDE (PRINZIDE;ZESTORETIC) 20-12.5 MG PER TABLET    0.5 tablets Indications: half of pil daily     MIRTAZAPINE (REMERON) 15 MG TABLET    Take 15 mg by mouth nightly    PAROXETINE (PAXIL) 40 MG TABLET           ALLERGIES     Codeine    FAMILY HISTORY     History reviewed. No pertinent family history.        SOCIAL HISTORY       Social History     Socioeconomic History    Marital status:      Spouse name: None    Number of children: None    Years of education: None    Highest education level: None   Occupational History    None   Social Needs    Financial resource strain: None    Food insecurity     Worry: None     Inability: None    Transportation needs     Medical: None     Non-medical: None   Tobacco Use    Smoking status: Current Every Day Smoker     Packs/day: 0.50    Smokeless tobacco: Never Used   Substance and Sexual Activity    Alcohol use: No    Drug use: No    Sexual activity: Not Currently   Lifestyle    Physical activity     Days per week: None     Minutes per session: None    Stress: None   Relationships    Social connections     Talks on phone: None     Gets together: None     Attends [employer] service: None     Active member of club or organization: None     Attends meetings of clubs or organizations: None     Relationship status: None    Intimate partner violence     Fear of current or ex partner: None     Emotionally abused: None     Physically abused: None     Forced sexual activity: None   Other Topics Concern    None   Social History Narrative    None       SCREENINGS   NIH Stroke Scale  Interval: Baseline  Level of Consciousness (1a. ): Not alert, but arousable by minor stimulation to obey  LOC Questions (1b. ): Answers neither question correctly  LOC Commands (1c. ): Performs neither task correctly  Facial Palsy (4. ): (!) Partial paralysis  Motor Arm, Left (5a. ): No movement  Motor Arm, Right (5b. ): Some effort against gravity  Motor Leg, Left (6a. ): No movement  Motor Leg, Right (6b. ): Drift, but does not hit bed  Limb Ataxia (7. ): (!) Present in two limbs  Sensory (8. ): (!) Severe to total loss  Best Language (9. ): Severe aphasia  Dysarthria (10. ): Severe dysarthriaGlasgow Coma Scale  Eye Opening: To pain  Best Verbal Response: None  Best Motor Response:  Withdraws from pain  [place] Coma Scale Score: 7          PHYSICAL EXAM    (up to 7 for level 4, 8 or more for level 5)     ED Triage Vitals [03/10/20 1844]   BP Temp Temp Source Pulse Resp SpO2 Height Weight   (!) 159/102 98.2 °F (36.8 °C) Axillary 107 16 96 % 5' 6\" (1.676 m) 140 lb (63.5 kg)       Physical Exam  Vitals signs and nursing note reviewed. Constitutional:        HENT:      Head: Normocephalic and atraumatic. Right Ear: No hemotympanum. Left Ear: No hemotympanum. Eyes:      General: Lids are normal.     Neck:      Musculoskeletal: Decreased range of motion. Muscular tenderness present. Cardiovascular:      Rate and Rhythm: Normal rate. Rhythm irregular. Pulmonary:      Breath sounds: Normal breath sounds. No decreased breath sounds, wheezing, rhonchi or rales. Abdominal:      General: Abdomen is flat. There is no distension or abdominal bruit. Tenderness: There is no abdominal tenderness. Neurological:      Mental Status: She is unresponsive. GCS: GCS eye subscore is 1. GCS verbal subscore is 1. Sensory: Sensory deficit present. Motor: Abnormal muscle tone present.       Comments: Patient has absolutely lower no movement on the left upper and left left lower  Gaze is fixed to the right very compatible with a large intracerebral event         DIAGNOSTIC RESULTS     EKG: All EKG's are interpreted by the Emergency Department Physician who either signs or Co-signs this chart in the absence of a cardiologist.        RADIOLOGY:   Non-plain film images such as CT, Ultrasound and MRI are read by the radiologist. Lucia Sidle radiographic images are visualized and preliminarily interpreted by the emergency physician with the below findings:        Interpretation per the Radiologist below, if available at the time of this note:    XR CHEST PORTABLE    (Results Pending)   CT Head WO Contrast    (Results Pending)           LABS:  Results for orders placed or performed during the hospital encounter of 03/10/20   CBC Auto Differential   Result Value Ref Range    WBC 7.9 4.0 - 11.0 K/uL    RBC 4.41 4.00 - 5.20 M/uL    Hemoglobin 13.0 12.0 - 16.0 g/dL    Hematocrit 39.2 36.0 - 48.0 %    MCV 88.9 80.0 - 100.0 fL    MCH 29.4 26.0 - 34.0 pg here.    [SY]      ED Course User Index  [SY] Uri Patch, DO         CRITICAL CARE TIME   This patient underwent 45 minutes of critical care time for acute unconscious state suddenly appearing. We reviewed her records. History was obtained from EMS  History was obtained from family  Patient was unresponsive immediately we did take her over for CT and a CTA we ended up canceling the CTAs because her CT without did show a large bleed  She had been on Eliquis but it has been a couple years since she did develop a spontaneous hip bleed  The patient is nonverbal gaze fixed to the right pupils appear to be symmetrical but unresponsive respirations appear to be adequate she is moving the right arm and the right leg a little bit spontaneously  EKG ordered and reviewed  Labs ordered reviewed. Discussion with neurosurgery  No procedure time was performed  CONSULTS:  None      PROCEDURES:     Procedures    MEDICATIONS GIVEN THIS VISIT:  Medications   0.9 % sodium chloride infusion (125 mL/hr Intravenous New Bag 3/10/20 1859)   ondansetron (ZOFRAN) injection 4 mg (4 mg Intravenous Given 3/10/20 1849)        FINAL IMPRESSION      1. DNR no code (do not resuscitate)    2. Nontraumatic cortical hemorrhage of right cerebral hemisphere [facility])            DISPOSITION/PLAN   DISPOSITION Decision To Admit 03/10/2020 06:59:38 PM      PATIENT REFERRED TO:  No follow-up provider specified. DISCHARGE MEDICATIONS:  New Prescriptions    No medications on file       Controlled Substances Monitoring  No flowsheet data found. (Please note that portions of this note were completed with a voice recognition program.  Efforts were made to edit the dictations but occasionally words are mis-transcribed.)    Patient was advised to return to the Emergency Department if there was any worsening.     Taya Reyes MD (electronically signed)  Attending Emergency Physician         Rich Soares MD  03/10/20 1900       Rich Soares MD  03/15/20 [id number] [facility]

## 2020-03-11 NOTE — ED NOTES
Family members at bedside. Aware of transfer.  awaiting startegic for pickup. [address] at [facility], [address] [facility]  03/10/20 2000

## 2020-03-11 NOTE — CARE COORDINATION
Case Management Assessment  Initial Evaluation    Date/Time of Evaluation: 3/11/2020 9:19 AM  Assessment Completed by: Moises Aguilar    Patient Name: June Lombard  YOB: 1932  Diagnosis: Intracranial bleed ([facility] [I62.9]  Intracranial hemorrhage ([facility] [I62.9]  Intracranial hemorrhage ([facility] [I62.9]  Date / Time: 3/10/2020  6:16 PM  Admission status/Date:  03/10/2020 2130  Chart Reviewed: Yes      Patient Interviewed: No   Family Interviewed:  Yes     Hospitalization in the last 30 days:  No    Contacts  :     Relationship to Patient:   Phone Number:    Alternate Contact:     Relationship to Patient:     Phone Number:       Met with: pt's family at bedside    Current PCP: SKYLAR Lane    Financial  Medicare/[employer]  Precert required for SNF : Y        3 night stay required -    ADLS  Support Systems: Family Members, Children  Transportation: family    Meal Preparation: family    Housing  Home Environment: lives w/dtr  Steps: 2  Plans to Return to Present Housing:   Other Identified Issues:    Eris Pimentel 78  Currently active with [address] : Yes - [facility]  Type of Home Care Services: [facility]  Passport/Waiver : No  :                      Phone Number:    Passport/Waiver Services: [address]   DME Provider: Em[address]: Walker___Cane___RTS___ BSC___Shower Chair___  02_X_ at __2__Liter(s)---Uses_cont_______HHN___ CPAP___ BiPap___ Hospital Bed___W/C____Other________      Has Home O2 in place on admit:  Yes  Informed of need to bring portable home O2 tank on day of discharge for nursing to connect prior to leaving:   Not Indicated  Verbalized agreement/Understanding:   Not Indicated    Community Service Affiliation  Dialysis:  No    · Name:  · Location  · Dialysis Schedule:  · Phone:   · Fax:     Outpatient PT/OT: No    Cancer Center: No     CHF Clinic: No     Pulmonary Rehab: No  Pain Clinic: No  [facility]

## 2020-03-11 NOTE — PROGRESS NOTES
Notified AUDREY Junior of increased temp with no response to PRN rectal tylenol. Denies any needs at this time. Call light in reach; will continue to monitor.      03/11/20 1045   Vital Signs   Temp 101.8 °F (38.8 °C)   Temp Source Axillary

## 2020-03-11 NOTE — PROGRESS NOTES
Shift assessment completed:    Pt is not alert, FATMATA orientation. Vitals are as follows: Elevated temp, elevated ID, elevated RR, elevated BP. Respiratory status is irregular, congested, labored at times, and SpO2 is 91% on 2L via NC. Pt is resting comfortably and appears to be in no pain. SR up 2/4. Bed in lowest position. Call light within reach. Bed alarm is on. Family at bedside. Will monitor.       03/11/20 0845   Vital Signs   Temp 101 °F (38.3 °C)   Temp Source Axillary   Pulse 155   Heart Rate Source Monitor   Resp 24   BP (!) 189/90   BP Location Right upper arm   BP Upper/Lower Upper   Patient Position Semi fowlers   Level of Consciousness 2   MEWS Score 7   Oxygen Therapy   SpO2 91 %   O2 Device Nasal cannula   O2 Flow Rate (L/min) 2 L/min

## 2020-03-11 NOTE — PROGRESS NOTES
Progress Note    Admit Date:  3/10/2020    80year-old female with history of atrial fibrillation presented to the ER with acute onset of altered mental status and right-sided facial droop. CT of her head showed a large hemorrhage in the right side of the cranium. She was admitted with plan for comfort care only    Subjective:  Ms. Demian Costello is unresponsive. She has multiple family members at bedside. Objective:   Patient Vitals for the past 4 hrs:   BP Temp Temp src Pulse Resp SpO2   03/11/20 0845 (!) 189/90 101 °F (38.3 °C) Axillary 155 24 91 %        No intake or output data in the 24 hours ending 03/11/20 0910    Physical Exam:    Gen: Elderly female who is unresponsive. Mildly labored breathing. ENT: No discharge. Pharynx clear. Neck: No JVD. Trachea midline. Resp: No accessory muscle use. No crackles. No wheezes. Diffuse rhonchi  CV: Regular rate. Regular rhythm. No murmur. No rub. No edema. GI: Non-tender. Non-distended. Normal bowel sounds. Skin: Warm and dry. No nodule on exposed extremities. No rash on exposed extremities. Scattered bruising to bilateral upper extremities  M/S: No cyanosis. No joint deformity. No clubbing. Neuro: Unresponsive, did not further assess  Psych: unAble to assess        I Saurabh Guido have reviewed the chart on Gabby Wilkerson and personally interviewed and examined patient, reviewed the data (labs and imaging) and after discussion with my PA formulated the plan. Agree with note with the following edits. HPI:     I reviewed the patient's Past Medical History, Past Surgical History, Medications, and Allergies.          General: elderly female unresponse with visible dyspnea noted    Neurological : obtunded                   Scheduled Meds:   sodium chloride flush  10 mL Intravenous 2 times per day       Continuous Infusions:      PRN Meds:  morphine, sodium chloride flush, acetaminophen **OR** acetaminophen, polyethylene glycol, promethazine **OR** ondansetron, LORazepam      Data:  CBC:   Recent Labs     03/10/20  1835   WBC 7.9   HGB 13.0   HCT 39.2   MCV 88.9        BMP:   Recent Labs     03/10/20  1835      K 3.6   CL 99   CO2 33*   BUN 11   CREATININE <0.5*     LIVER PROFILE:   Recent Labs     03/10/20  1835   AST 23   ALT 15   BILITOT 0.5   ALKPHOS 98     PT/INR:   Recent Labs     03/10/20  1835   PROTIME 11.7   INR 1.01      RADIOLOGY  XR CHEST PORTABLE   Final Result   Patchy and somewhat nodular right mid and lower lung airspace disease. Although findings could represent pneumonia, short-term follow-up to   resolution is recommended particularly as right mid lung airspace disease is   persistent or recurrent. A CT may be necessary if the chest x-ray does not   normalize on a short-term basis, particularly given underlying emphysema. CT Head WO Contrast   Final Result   There is a large hemorrhage within the right side of the cranium centered in   the right frontal lobe with intraparenchymal and extra-axial components   causing a large amount of local mass effect as well as right to left shift   compression of the ventricles and mass effect at the suprasellar cistern with   infratentorial herniation. There is a mixed component of subdural fluid suggesting acute on chronic   change. Findings were discussed with Simeon Monterroso at 7:00 pm on 3/10/2020. Assessment/Plan:    #Acute intracranial hemorrhage  -Patient presented to the emergency department after acute onset of right-sided facial droop and altered mental status. CT of her head showed a large hemorrhage in the right side of the cranium with shift.   The case was discussed with neurosurgery by the ER physician, no intervention recommended.  -Patient's CODE STATUS is DNR CC, she has multiple family members accompanying her currently, her family is in agreement with comfort measures only and states this is in accord with patient's prior stated wishes  -Continue PRN Ativan and morphine for comfort measures    Diet: Diet NPO Effective Now  Code Status: DNR-CC    Magdalena Duque PA-C  3/11/2020 9:13 AM      Agree with above  Changes made to note    Buckley Bence, MD 3/11/2020 9:34 AM

## 2020-03-11 NOTE — PROGRESS NOTES
Family called out stating patient was seeming more restless and uncomfortable. RN assessed patient, noted patient breathing a little harder than before, and more restless. PRN ativan given per order. Will continue to monitor. Family at bedside.

## 2020-03-11 NOTE — ED NOTES
Report provided to [facility], [address] [place] [address] Drake Carrizales RN  03/10/20 2022

## 2020-03-11 NOTE — CARE COORDINATION
[facility]  Received referral regarding [facility]. services from [address]. Pt was recently a non admit to [facility] due to insurance. Pt was noted to have MCR, however, pt is active with an [employer] insurance plan. [facility] is not in network with pt's insurance. [facility] is active with pt. Informed [address].     Electronically signed by Jeffrey Ray RN on 3/11/2020 at 12:31 PM

## 2020-03-11 NOTE — PROGRESS NOTES
Patient is not able to demonstrate the ability to move from a reclining position to an upright position within the recliner due to end of life.

## 2020-03-11 NOTE — PLAN OF CARE
ADVANCED CARE PLANNING    Chava Perkins       :  1932              MRN:  [id number]      Purpose of Encounter: Advanced care planning in light of  [address]  Parties in attendance: :Laura Rodriguez, Tenisha Duarte MD, Family members   Decisional Capacity:No     Subjective/Patient Story:   Patient no longer wishes further curative interventions, including hospitalization, if there is no long term benefit :Yes  Patient wishes to continue further interventions, patient will re-evaluate at a later time: Yes    Objective/Medical Story: patient with prior DNRcc status presents with ams, ct head with large ich. Goals of Care Determinations:      Plan: Will notify RAJAT Harrison CNP of change in care plan. Will look at further interventions as needed. Code Status: At this time patient wishes to be DNR-CC    Time Spent on Advanced Planning Documents: 15 minutes    Advanced Care Planning Documents: Completed advances directives, advanced directives in chart. Electronically signed by Tenisha Duarte MD on 3/11/2020 at 6:29 AM  Thank you RAJAT Harrison CNP for the opportunity to be involved in this patient's care. If you have any questions or concerns please feel free to contact me at [phone].

## 2020-03-12 NOTE — DISCHARGE SUMMARY
Name:  Ulysees Siemens  Room:  1632/2366-01  MRN:    [id number]    Death Summary    Attending Physician: Dr. Kevin Reidner: 3/10/2020  Date of death:   3/12/2020   Time of death: 722   Cause of death: Intracranial hemorrhage     HPI taken from admission H&P:    80 y.o. female who presents with acute onset AMS noted mid sentence without any prior complaint. CT head obtained in ER c/w large intracranial hemorrhage. No neurosurgical intervention. With prior [facility] status, patient admitted for continued comfort care measures with palliative care consult in am.    Diagnoses this Admission and Hospital Course     #Acute intracranial hemor rhage  -Patient presented to the emergency department after acute onset of right-sided facial droop and altered mental status. CT of her head showed a large hemorrhage in the right side of the cranium with shift. The case was discussed with neurosurgery by the ER physician, no intervention recommended.  -Patient's CODE STATUS is DNR CC, she has multiple family members accompanying her currently, her family is in agreement with comfort measures only and states this is in accord with patient's prior stated wishes  -Continued PRN Ativan and morphine for comfort measures.   Patient  on 3/12/2020 at [phone]

## 2020-03-12 NOTE — PROGRESS NOTES
Vitals check at this time, stable. Axillary temp 101.5, comfort measures provided. Extremities are cool to touch. Breathing is shallow. She is not alert. PRN morphine, ativan, and tylenol given for comfort. Family at bedside, active listening and comfort measures provided to them as well. Will continue to monitor.

## 2020-03-12 NOTE — PROGRESS NOTES
Pt  @ 07:22 AM. Verified by Hyacinth Thomas, RN and Meño Warner, RN. Both nurses listened and palpated pulse for 1 full minute. No pulse or heartbeat present. Family offered spiritual care. [facility] called and message left. Clinical RN [facility] notified. Charge RN [facility] notified. Dr. Jaylene tuttle served with information.

## 2020-03-12 NOTE — PROGRESS NOTES
Transfer of care to Latrell Smart, [place]. Patient resting in bed, family at bedside. Agonal breathing noted. Care transferred at this time.

## 2024-07-10 NOTE — ED NOTES
"Alerted by 1:1 PCA at 1400 that patient was exhibiting paranoid / delusional behaviors. Whereas previously unable to ambulate, patient observed to lift leg over bed rail and ambulate in an attempt to crouch behind the bed / hide. Resistant to all efforts to calm by staff, repeatedly stating \"I don't know what is happening\" and \"something is wrong with me\". Pt's o/p CM present at time of delusion, control team called in attempt to bring patient safely back to bed. Patient dropped to crouching position and cowered, staff attempted to give space and coax patient to stand and return to bed. SLIM attending present, ordered additional 1mg ativan IVP x 1 to be given immediately. Ultimately patient had to be lifted by staff to return her to bed, where ativan was given. Minor scratches self inflicted on L. Knee noted, otherwise no change to physical symptoms: diaphoretic and clammy as patient as been since admission. Vitals WDL aside from mild tachypnea: 98.5F, 159/80, 93 HR, 24 RR, 90% O2 sat on RA.   " Bed: 12  Expected date:   Expected time:   Means of arrival:   Comments:  Squad ?????     Krista Gallegos  12/31/19 1010

## 2025-06-24 NOTE — PROGRESS NOTES
Pt had small formed BM, cancelled order per protocol for c-diff collection specimen. Report given @ bedside to Marilou Mccormack, for transferral of care. Pt resting in bed. Call light in reach. [facility]  Inpatient/Observation/Outpatient Rehabilitation    Date: 2025  Patient Name: Hany Hoskins       [] Inpatient Acute/Observation       [x]  Outpatient  : 2014         [] Pt refused/declined therapy at this time due to:           [] Pt cancelled due to:  [] No Reason Given   [] Sick/ill   [] Other:  no ac    [] Evaluation held by RN/Provider/Physical Therapist due to:    [] High Heart Rate   [] High Blood Pressure   [] Orthopedic Consult   [] Hgb < 7   [] Other:    [] Pt ordered brace per physician request:  [] Proper fit will be completed and education for wearing/skin checks    [] Pt does not require skilled services due to:      Therapist/Assistant will attempt to see this patient, at our earliest opportunity.       Stacie Rubi Date: 2025